# Patient Record
Sex: MALE | Race: WHITE | NOT HISPANIC OR LATINO | Employment: FULL TIME | URBAN - METROPOLITAN AREA
[De-identification: names, ages, dates, MRNs, and addresses within clinical notes are randomized per-mention and may not be internally consistent; named-entity substitution may affect disease eponyms.]

---

## 2017-02-27 ENCOUNTER — ALLSCRIPTS OFFICE VISIT (OUTPATIENT)
Dept: OTHER | Facility: OTHER | Age: 62
End: 2017-02-27

## 2017-02-27 DIAGNOSIS — S43.432A SUPERIOR GLENOID LABRUM LESION OF LEFT SHOULDER: ICD-10-CM

## 2017-03-07 ENCOUNTER — GENERIC CONVERSION - ENCOUNTER (OUTPATIENT)
Dept: OTHER | Facility: OTHER | Age: 62
End: 2017-03-07

## 2017-03-07 ENCOUNTER — APPOINTMENT (OUTPATIENT)
Dept: PHYSICAL THERAPY | Age: 62
End: 2017-03-07
Payer: COMMERCIAL

## 2017-03-07 DIAGNOSIS — S43.432A SUPERIOR GLENOID LABRUM LESION OF LEFT SHOULDER: ICD-10-CM

## 2017-03-07 PROCEDURE — 97110 THERAPEUTIC EXERCISES: CPT

## 2017-03-07 PROCEDURE — 97161 PT EVAL LOW COMPLEX 20 MIN: CPT

## 2017-03-14 ENCOUNTER — APPOINTMENT (OUTPATIENT)
Dept: PHYSICAL THERAPY | Age: 62
End: 2017-03-14
Payer: COMMERCIAL

## 2017-03-15 ENCOUNTER — APPOINTMENT (OUTPATIENT)
Dept: PHYSICAL THERAPY | Age: 62
End: 2017-03-15
Payer: COMMERCIAL

## 2017-03-15 PROCEDURE — 97140 MANUAL THERAPY 1/> REGIONS: CPT

## 2017-03-15 PROCEDURE — 97110 THERAPEUTIC EXERCISES: CPT

## 2017-03-16 ENCOUNTER — APPOINTMENT (OUTPATIENT)
Dept: PHYSICAL THERAPY | Age: 62
End: 2017-03-16
Payer: COMMERCIAL

## 2017-03-16 PROCEDURE — 97140 MANUAL THERAPY 1/> REGIONS: CPT

## 2017-03-16 PROCEDURE — 97110 THERAPEUTIC EXERCISES: CPT

## 2017-03-20 ENCOUNTER — APPOINTMENT (OUTPATIENT)
Dept: PHYSICAL THERAPY | Age: 62
End: 2017-03-20
Payer: COMMERCIAL

## 2017-03-20 PROCEDURE — 97110 THERAPEUTIC EXERCISES: CPT

## 2017-03-20 PROCEDURE — 97140 MANUAL THERAPY 1/> REGIONS: CPT

## 2017-03-24 ENCOUNTER — APPOINTMENT (OUTPATIENT)
Dept: PHYSICAL THERAPY | Age: 62
End: 2017-03-24
Payer: COMMERCIAL

## 2017-03-24 PROCEDURE — 97140 MANUAL THERAPY 1/> REGIONS: CPT

## 2017-03-24 PROCEDURE — 97110 THERAPEUTIC EXERCISES: CPT

## 2017-03-27 ENCOUNTER — APPOINTMENT (OUTPATIENT)
Dept: PHYSICAL THERAPY | Age: 62
End: 2017-03-27
Payer: COMMERCIAL

## 2017-03-27 PROCEDURE — 97110 THERAPEUTIC EXERCISES: CPT

## 2017-03-27 PROCEDURE — 97140 MANUAL THERAPY 1/> REGIONS: CPT

## 2017-03-29 ENCOUNTER — APPOINTMENT (OUTPATIENT)
Dept: PHYSICAL THERAPY | Age: 62
End: 2017-03-29
Payer: COMMERCIAL

## 2017-03-29 PROCEDURE — 97110 THERAPEUTIC EXERCISES: CPT

## 2017-04-03 ENCOUNTER — APPOINTMENT (OUTPATIENT)
Dept: PHYSICAL THERAPY | Age: 62
End: 2017-04-03
Payer: COMMERCIAL

## 2017-04-06 ENCOUNTER — GENERIC CONVERSION - ENCOUNTER (OUTPATIENT)
Dept: OTHER | Facility: OTHER | Age: 62
End: 2017-04-06

## 2017-04-06 ENCOUNTER — APPOINTMENT (OUTPATIENT)
Dept: PHYSICAL THERAPY | Age: 62
End: 2017-04-06
Payer: COMMERCIAL

## 2017-04-06 PROCEDURE — 97530 THERAPEUTIC ACTIVITIES: CPT

## 2017-04-06 PROCEDURE — 97110 THERAPEUTIC EXERCISES: CPT

## 2017-04-17 ENCOUNTER — ALLSCRIPTS OFFICE VISIT (OUTPATIENT)
Dept: OTHER | Facility: OTHER | Age: 62
End: 2017-04-17

## 2017-04-17 DIAGNOSIS — K21.9 GASTRO-ESOPHAGEAL REFLUX DISEASE WITHOUT ESOPHAGITIS: ICD-10-CM

## 2017-04-17 DIAGNOSIS — S43.432A SUPERIOR GLENOID LABRUM LESION OF LEFT SHOULDER: ICD-10-CM

## 2017-04-17 DIAGNOSIS — Z12.5 ENCOUNTER FOR SCREENING FOR MALIGNANT NEOPLASM OF PROSTATE: ICD-10-CM

## 2017-04-24 ENCOUNTER — APPOINTMENT (OUTPATIENT)
Dept: PHYSICAL THERAPY | Age: 62
End: 2017-04-24
Payer: COMMERCIAL

## 2017-04-25 ENCOUNTER — GENERIC CONVERSION - ENCOUNTER (OUTPATIENT)
Dept: OTHER | Facility: OTHER | Age: 62
End: 2017-04-25

## 2017-04-25 ENCOUNTER — APPOINTMENT (OUTPATIENT)
Dept: PHYSICAL THERAPY | Facility: CLINIC | Age: 62
End: 2017-04-25
Payer: COMMERCIAL

## 2017-04-25 DIAGNOSIS — S43.432A SUPERIOR GLENOID LABRUM LESION OF LEFT SHOULDER: ICD-10-CM

## 2017-04-25 PROCEDURE — 97110 THERAPEUTIC EXERCISES: CPT

## 2017-04-25 PROCEDURE — 97161 PT EVAL LOW COMPLEX 20 MIN: CPT

## 2017-04-25 RX ORDER — LANOLIN ALCOHOL/MO/W.PET/CERES
400 CREAM (GRAM) TOPICAL DAILY
COMMUNITY

## 2017-04-25 RX ORDER — ASPIRIN 81 MG/1
81 TABLET ORAL DAILY
COMMUNITY
End: 2019-04-08 | Stop reason: ALTCHOICE

## 2017-04-25 RX ORDER — TADALAFIL 20 MG/1
20 TABLET ORAL DAILY PRN
COMMUNITY
End: 2019-04-08 | Stop reason: ALTCHOICE

## 2017-04-25 RX ORDER — MULTIVITAMIN
1 TABLET ORAL DAILY
COMMUNITY

## 2017-04-27 ENCOUNTER — ALLSCRIPTS OFFICE VISIT (OUTPATIENT)
Dept: OTHER | Facility: OTHER | Age: 62
End: 2017-04-27

## 2017-05-01 ENCOUNTER — ANESTHESIA EVENT (OUTPATIENT)
Dept: PERIOP | Facility: HOSPITAL | Age: 62
End: 2017-05-01
Payer: COMMERCIAL

## 2017-05-02 ENCOUNTER — ANESTHESIA (OUTPATIENT)
Dept: PERIOP | Facility: HOSPITAL | Age: 62
End: 2017-05-02
Payer: COMMERCIAL

## 2017-05-02 ENCOUNTER — HOSPITAL ENCOUNTER (OUTPATIENT)
Facility: HOSPITAL | Age: 62
Setting detail: OUTPATIENT SURGERY
Discharge: HOME | End: 2017-05-02
Attending: ORTHOPAEDIC SURGERY | Admitting: ORTHOPAEDIC SURGERY
Payer: COMMERCIAL

## 2017-05-02 VITALS
HEIGHT: 66 IN | RESPIRATION RATE: 18 BRPM | HEART RATE: 83 BPM | DIASTOLIC BLOOD PRESSURE: 78 MMHG | WEIGHT: 200 LBS | OXYGEN SATURATION: 95 % | SYSTOLIC BLOOD PRESSURE: 122 MMHG | BODY MASS INDEX: 32.14 KG/M2 | TEMPERATURE: 99 F

## 2017-05-02 PROBLEM — S43.432A SUPERIOR GLENOID LABRUM LESION OF LEFT SHOULDER: Status: ACTIVE | Noted: 2017-05-02

## 2017-05-02 PROCEDURE — C1713 ANCHOR/SCREW BN/BN,TIS/BN: HCPCS | Performed by: ORTHOPAEDIC SURGERY

## 2017-05-02 DEVICE — DEPUY MITEK HEALIX ADVANCE 4.5 BR: Type: IMPLANTABLE DEVICE | Status: FUNCTIONAL

## 2017-05-02 RX ORDER — ACETAMINOPHEN 325 MG/1
650 TABLET ORAL EVERY 6 HOURS PRN
Status: DISCONTINUED | OUTPATIENT
Start: 2017-05-02 | End: 2017-05-02 | Stop reason: HOSPADM

## 2017-05-02 RX ORDER — ALBUTEROL SULFATE 2.5 MG/3ML
2.5 SOLUTION RESPIRATORY (INHALATION) AS NEEDED
Status: DISCONTINUED | OUTPATIENT
Start: 2017-05-02 | End: 2017-05-02 | Stop reason: HOSPADM

## 2017-05-02 RX ORDER — KETOROLAC TROMETHAMINE 30 MG/ML
INJECTION, SOLUTION INTRAMUSCULAR; INTRAVENOUS AS NEEDED
Status: DISCONTINUED | OUTPATIENT
Start: 2017-05-02 | End: 2017-05-02 | Stop reason: SURG

## 2017-05-02 RX ORDER — FENTANYL CITRATE/PF 50 MCG/ML
25 SYRINGE (ML) INJECTION
Status: DISCONTINUED | OUTPATIENT
Start: 2017-05-02 | End: 2017-05-02 | Stop reason: HOSPADM

## 2017-05-02 RX ORDER — ONDANSETRON 2 MG/ML
4 INJECTION INTRAMUSCULAR; INTRAVENOUS EVERY 6 HOURS PRN
Status: DISCONTINUED | OUTPATIENT
Start: 2017-05-02 | End: 2017-05-02 | Stop reason: HOSPADM

## 2017-05-02 RX ORDER — MIDAZOLAM HYDROCHLORIDE 1 MG/ML
INJECTION INTRAMUSCULAR; INTRAVENOUS AS NEEDED
Status: DISCONTINUED | OUTPATIENT
Start: 2017-05-02 | End: 2017-05-02 | Stop reason: SURG

## 2017-05-02 RX ORDER — OXYCODONE HYDROCHLORIDE 5 MG/1
5 TABLET ORAL EVERY 4 HOURS PRN
Status: DISCONTINUED | OUTPATIENT
Start: 2017-05-02 | End: 2017-05-02 | Stop reason: HOSPADM

## 2017-05-02 RX ORDER — FENTANYL CITRATE 50 UG/ML
INJECTION, SOLUTION INTRAMUSCULAR; INTRAVENOUS AS NEEDED
Status: DISCONTINUED | OUTPATIENT
Start: 2017-05-02 | End: 2017-05-02 | Stop reason: SURG

## 2017-05-02 RX ORDER — PROPRANOLOL/HYDROCHLOROTHIAZID 40 MG-25MG
1000 TABLET ORAL DAILY
COMMUNITY

## 2017-05-02 RX ORDER — METOCLOPRAMIDE HYDROCHLORIDE 5 MG/ML
10 INJECTION INTRAMUSCULAR; INTRAVENOUS EVERY 6 HOURS PRN
Status: DISCONTINUED | OUTPATIENT
Start: 2017-05-02 | End: 2017-05-02 | Stop reason: HOSPADM

## 2017-05-02 RX ORDER — LIDOCAINE HYDROCHLORIDE 10 MG/ML
INJECTION, SOLUTION INFILTRATION; PERINEURAL AS NEEDED
Status: DISCONTINUED | OUTPATIENT
Start: 2017-05-02 | End: 2017-05-02 | Stop reason: SURG

## 2017-05-02 RX ORDER — PROPOFOL 10 MG/ML
INJECTION, EMULSION INTRAVENOUS AS NEEDED
Status: DISCONTINUED | OUTPATIENT
Start: 2017-05-02 | End: 2017-05-02 | Stop reason: SURG

## 2017-05-02 RX ORDER — ROPIVACAINE HYDROCHLORIDE 5 MG/ML
INJECTION, SOLUTION EPIDURAL; INFILTRATION; PERINEURAL AS NEEDED
Status: DISCONTINUED | OUTPATIENT
Start: 2017-05-02 | End: 2017-05-02 | Stop reason: SURG

## 2017-05-02 RX ORDER — ONDANSETRON 2 MG/ML
INJECTION INTRAMUSCULAR; INTRAVENOUS AS NEEDED
Status: DISCONTINUED | OUTPATIENT
Start: 2017-05-02 | End: 2017-05-02 | Stop reason: SURG

## 2017-05-02 RX ORDER — ONDANSETRON 2 MG/ML
4 INJECTION INTRAMUSCULAR; INTRAVENOUS ONCE AS NEEDED
Status: DISCONTINUED | OUTPATIENT
Start: 2017-05-02 | End: 2017-05-02 | Stop reason: HOSPADM

## 2017-05-02 RX ORDER — SODIUM CHLORIDE, SODIUM LACTATE, POTASSIUM CHLORIDE, CALCIUM CHLORIDE 600; 310; 30; 20 MG/100ML; MG/100ML; MG/100ML; MG/100ML
125 INJECTION, SOLUTION INTRAVENOUS CONTINUOUS
Status: DISCONTINUED | OUTPATIENT
Start: 2017-05-02 | End: 2017-05-02 | Stop reason: HOSPADM

## 2017-05-02 RX ORDER — OXYCODONE HYDROCHLORIDE 5 MG/1
TABLET ORAL
Qty: 45 TABLET | Refills: 0 | Status: SHIPPED | OUTPATIENT
Start: 2017-05-02 | End: 2018-05-30 | Stop reason: ALTCHOICE

## 2017-05-02 RX ADMIN — Medication 2000 MG: at 10:48

## 2017-05-02 RX ADMIN — KETOROLAC TROMETHAMINE 30 MG: 30 INJECTION, SOLUTION INTRAMUSCULAR at 11:31

## 2017-05-02 RX ADMIN — SODIUM CHLORIDE, SODIUM LACTATE, POTASSIUM CHLORIDE, AND CALCIUM CHLORIDE 125 ML/HR: .6; .31; .03; .02 INJECTION, SOLUTION INTRAVENOUS at 08:27

## 2017-05-02 RX ADMIN — SODIUM CHLORIDE, SODIUM LACTATE, POTASSIUM CHLORIDE, AND CALCIUM CHLORIDE: .6; .31; .03; .02 INJECTION, SOLUTION INTRAVENOUS at 10:35

## 2017-05-02 RX ADMIN — ROPIVACAINE HYDROCHLORIDE: 2 INJECTION, SOLUTION EPIDURAL; INFILTRATION at 12:11

## 2017-05-02 RX ADMIN — FENTANYL CITRATE 50 MCG: 50 INJECTION, SOLUTION INTRAMUSCULAR; INTRAVENOUS at 10:59

## 2017-05-02 RX ADMIN — ROPIVACAINE HYDROCHLORIDE 20 ML: 5 INJECTION, SOLUTION EPIDURAL; INFILTRATION; PERINEURAL at 10:01

## 2017-05-02 RX ADMIN — SODIUM CHLORIDE, SODIUM LACTATE, POTASSIUM CHLORIDE, AND CALCIUM CHLORIDE 125 ML/HR: .6; .31; .03; .02 INJECTION, SOLUTION INTRAVENOUS at 12:30

## 2017-05-02 RX ADMIN — MIDAZOLAM HYDROCHLORIDE 2 MG: 1 INJECTION, SOLUTION INTRAMUSCULAR; INTRAVENOUS at 09:55

## 2017-05-02 RX ADMIN — FENTANYL CITRATE 50 MCG: 50 INJECTION, SOLUTION INTRAMUSCULAR; INTRAVENOUS at 09:55

## 2017-05-02 RX ADMIN — DEXAMETHASONE SODIUM PHOSPHATE 4 MG: 10 INJECTION INTRAMUSCULAR; INTRAVENOUS at 10:55

## 2017-05-02 RX ADMIN — LIDOCAINE HYDROCHLORIDE 50 MG: 10 INJECTION, SOLUTION INFILTRATION; PERINEURAL at 10:41

## 2017-05-02 RX ADMIN — ONDANSETRON 4 MG: 2 INJECTION INTRAMUSCULAR; INTRAVENOUS at 10:55

## 2017-05-02 RX ADMIN — PROPOFOL 200 MG: 10 INJECTION, EMULSION INTRAVENOUS at 10:41

## 2017-05-04 ENCOUNTER — ALLSCRIPTS OFFICE VISIT (OUTPATIENT)
Dept: OTHER | Facility: OTHER | Age: 62
End: 2017-05-04

## 2017-05-05 ENCOUNTER — APPOINTMENT (OUTPATIENT)
Dept: PHYSICAL THERAPY | Facility: CLINIC | Age: 62
End: 2017-05-05
Payer: COMMERCIAL

## 2017-05-05 PROCEDURE — 97110 THERAPEUTIC EXERCISES: CPT

## 2017-05-05 PROCEDURE — 97140 MANUAL THERAPY 1/> REGIONS: CPT

## 2017-05-09 ENCOUNTER — APPOINTMENT (OUTPATIENT)
Dept: PHYSICAL THERAPY | Facility: CLINIC | Age: 62
End: 2017-05-09
Payer: COMMERCIAL

## 2017-05-09 PROCEDURE — 97140 MANUAL THERAPY 1/> REGIONS: CPT

## 2017-05-09 PROCEDURE — 97110 THERAPEUTIC EXERCISES: CPT

## 2017-05-10 ENCOUNTER — GENERIC CONVERSION - ENCOUNTER (OUTPATIENT)
Dept: OTHER | Facility: OTHER | Age: 62
End: 2017-05-10

## 2017-05-12 ENCOUNTER — APPOINTMENT (OUTPATIENT)
Dept: PHYSICAL THERAPY | Facility: CLINIC | Age: 62
End: 2017-05-12
Payer: COMMERCIAL

## 2017-05-12 PROCEDURE — 97140 MANUAL THERAPY 1/> REGIONS: CPT

## 2017-05-12 PROCEDURE — 97110 THERAPEUTIC EXERCISES: CPT

## 2017-05-16 ENCOUNTER — APPOINTMENT (OUTPATIENT)
Dept: PHYSICAL THERAPY | Facility: CLINIC | Age: 62
End: 2017-05-16
Payer: COMMERCIAL

## 2017-05-16 PROCEDURE — 97140 MANUAL THERAPY 1/> REGIONS: CPT

## 2017-05-16 PROCEDURE — 97110 THERAPEUTIC EXERCISES: CPT

## 2017-05-18 ENCOUNTER — GENERIC CONVERSION - ENCOUNTER (OUTPATIENT)
Dept: OTHER | Facility: OTHER | Age: 62
End: 2017-05-18

## 2017-05-19 ENCOUNTER — APPOINTMENT (OUTPATIENT)
Dept: PHYSICAL THERAPY | Facility: CLINIC | Age: 62
End: 2017-05-19
Payer: COMMERCIAL

## 2017-05-19 PROCEDURE — 97110 THERAPEUTIC EXERCISES: CPT

## 2017-05-19 PROCEDURE — 97140 MANUAL THERAPY 1/> REGIONS: CPT

## 2017-05-23 ENCOUNTER — APPOINTMENT (OUTPATIENT)
Dept: PHYSICAL THERAPY | Facility: CLINIC | Age: 62
End: 2017-05-23
Payer: COMMERCIAL

## 2017-05-23 PROCEDURE — 97140 MANUAL THERAPY 1/> REGIONS: CPT

## 2017-05-23 PROCEDURE — 97110 THERAPEUTIC EXERCISES: CPT

## 2017-05-26 ENCOUNTER — APPOINTMENT (OUTPATIENT)
Dept: PHYSICAL THERAPY | Facility: CLINIC | Age: 62
End: 2017-05-26
Payer: COMMERCIAL

## 2017-05-26 PROCEDURE — 97140 MANUAL THERAPY 1/> REGIONS: CPT

## 2017-05-26 PROCEDURE — 97110 THERAPEUTIC EXERCISES: CPT

## 2017-05-30 ENCOUNTER — APPOINTMENT (OUTPATIENT)
Dept: PHYSICAL THERAPY | Facility: CLINIC | Age: 62
End: 2017-05-30
Payer: COMMERCIAL

## 2017-05-30 PROCEDURE — 97140 MANUAL THERAPY 1/> REGIONS: CPT

## 2017-05-30 PROCEDURE — 97110 THERAPEUTIC EXERCISES: CPT

## 2017-06-02 ENCOUNTER — APPOINTMENT (OUTPATIENT)
Dept: PHYSICAL THERAPY | Facility: CLINIC | Age: 62
End: 2017-06-02
Payer: COMMERCIAL

## 2017-06-02 PROCEDURE — 97140 MANUAL THERAPY 1/> REGIONS: CPT

## 2017-06-02 PROCEDURE — 97110 THERAPEUTIC EXERCISES: CPT

## 2017-06-07 ENCOUNTER — GENERIC CONVERSION - ENCOUNTER (OUTPATIENT)
Dept: OTHER | Facility: OTHER | Age: 62
End: 2017-06-07

## 2017-06-07 LAB
A/G RATIO (HISTORICAL): 1.9 (ref 1.2–2.2)
ALBUMIN SERPL BCP-MCNC: 4.4 G/DL (ref 3.6–4.8)
ALP SERPL-CCNC: 45 IU/L (ref 39–117)
ALT SERPL W P-5'-P-CCNC: 15 IU/L (ref 0–44)
AMBIG ABBREV CMP14 DEFAULT (HISTORICAL): NORMAL
AMBIG ABBREV LP DEFAULT (HISTORICAL): NORMAL
AST SERPL W P-5'-P-CCNC: 19 IU/L (ref 0–40)
BASOPHILS # BLD AUTO: 0 X10E3/UL (ref 0–0.2)
BASOPHILS # BLD AUTO: 1 %
BILIRUB SERPL-MCNC: 0.9 MG/DL (ref 0–1.2)
BUN SERPL-MCNC: 14 MG/DL (ref 8–27)
BUN/CREA RATIO (HISTORICAL): 15 (ref 10–24)
CALCIUM SERPL-MCNC: 8.9 MG/DL (ref 8.6–10.2)
CHLORIDE SERPL-SCNC: 101 MMOL/L (ref 96–106)
CHOLEST SERPL-MCNC: 204 MG/DL (ref 100–199)
CO2 SERPL-SCNC: 22 MMOL/L (ref 18–29)
CREAT SERPL-MCNC: 0.96 MG/DL (ref 0.76–1.27)
DEPRECATED RDW RBC AUTO: 13.1 % (ref 12.3–15.4)
EGFR AFRICAN AMERICAN (HISTORICAL): 99 ML/MIN/1.73
EGFR-AMERICAN CALC (HISTORICAL): 86 ML/MIN/1.73
EOSINOPHIL # BLD AUTO: 0.2 X10E3/UL (ref 0–0.4)
EOSINOPHIL # BLD AUTO: 3 %
GLUCOSE SERPL-MCNC: 101 MG/DL (ref 65–99)
HCT VFR BLD AUTO: 42.3 % (ref 37.5–51)
HDLC SERPL-MCNC: 80 MG/DL
HGB BLD-MCNC: 14.7 G/DL (ref 12.6–17.7)
IMM.GRANULOCYTES (CD4/8) (HISTORICAL): 0 %
IMM.GRANULOCYTES (CD4/8) (HISTORICAL): 0 X10E3/UL (ref 0–0.1)
LDLC SERPL CALC-MCNC: 113 MG/DL (ref 0–99)
LYMPHOCYTES # BLD AUTO: 1.6 X10E3/UL (ref 0.7–3.1)
LYMPHOCYTES # BLD AUTO: 28 %
MCH RBC QN AUTO: 31.8 PG (ref 26.6–33)
MCHC RBC AUTO-ENTMCNC: 34.8 G/DL (ref 31.5–35.7)
MCV RBC AUTO: 92 FL (ref 79–97)
MONOCYTES # BLD AUTO: 0.5 X10E3/UL (ref 0.1–0.9)
MONOCYTES (HISTORICAL): 9 %
NEUTROPHILS # BLD AUTO: 3.4 X10E3/UL (ref 1.4–7)
NEUTROPHILS # BLD AUTO: 59 %
PLATELET # BLD AUTO: 243 X10E3/UL (ref 150–379)
POTASSIUM SERPL-SCNC: 4.3 MMOL/L (ref 3.5–5.2)
PROSTATE SPECIFIC ANTIGEN (HISTORICAL): 1.3 NG/ML (ref 0–4)
RBC (HISTORICAL): 4.62 X10E6/UL (ref 4.14–5.8)
SODIUM SERPL-SCNC: 141 MMOL/L (ref 134–144)
TOT. GLOBULIN, SERUM (HISTORICAL): 2.3 G/DL (ref 1.5–4.5)
TOTAL PROTEIN (HISTORICAL): 6.7 G/DL (ref 6–8.5)
TRIGL SERPL-MCNC: 57 MG/DL (ref 0–149)
VLDLC SERPL CALC-MCNC: 11 MG/DL (ref 5–40)
WBC # BLD AUTO: 5.7 X10E3/UL (ref 3.4–10.8)

## 2017-06-08 LAB — TSH SERPL DL<=0.05 MIU/L-ACNC: 1.63 UIU/ML (ref 0.45–4.5)

## 2017-06-09 ENCOUNTER — GENERIC CONVERSION - ENCOUNTER (OUTPATIENT)
Dept: OTHER | Facility: OTHER | Age: 62
End: 2017-06-09

## 2017-06-09 ENCOUNTER — APPOINTMENT (OUTPATIENT)
Dept: PHYSICAL THERAPY | Facility: CLINIC | Age: 62
End: 2017-06-09
Payer: COMMERCIAL

## 2017-06-09 PROCEDURE — 97140 MANUAL THERAPY 1/> REGIONS: CPT

## 2017-06-09 PROCEDURE — 97110 THERAPEUTIC EXERCISES: CPT

## 2017-06-15 ENCOUNTER — APPOINTMENT (OUTPATIENT)
Dept: PHYSICAL THERAPY | Facility: CLINIC | Age: 62
End: 2017-06-15
Payer: COMMERCIAL

## 2017-06-15 PROCEDURE — 97110 THERAPEUTIC EXERCISES: CPT

## 2017-06-15 PROCEDURE — 97140 MANUAL THERAPY 1/> REGIONS: CPT

## 2017-06-22 ENCOUNTER — APPOINTMENT (OUTPATIENT)
Dept: PHYSICAL THERAPY | Facility: CLINIC | Age: 62
End: 2017-06-22
Payer: COMMERCIAL

## 2017-06-22 PROCEDURE — 97140 MANUAL THERAPY 1/> REGIONS: CPT

## 2017-06-22 PROCEDURE — 97110 THERAPEUTIC EXERCISES: CPT

## 2017-06-29 ENCOUNTER — GENERIC CONVERSION - ENCOUNTER (OUTPATIENT)
Dept: OTHER | Facility: OTHER | Age: 62
End: 2017-06-29

## 2017-06-29 ENCOUNTER — APPOINTMENT (OUTPATIENT)
Dept: PHYSICAL THERAPY | Facility: CLINIC | Age: 62
End: 2017-06-29
Payer: COMMERCIAL

## 2017-06-29 PROCEDURE — 97110 THERAPEUTIC EXERCISES: CPT

## 2017-06-29 PROCEDURE — 97140 MANUAL THERAPY 1/> REGIONS: CPT

## 2017-07-06 ENCOUNTER — ALLSCRIPTS OFFICE VISIT (OUTPATIENT)
Dept: OTHER | Facility: OTHER | Age: 62
End: 2017-07-06

## 2017-12-14 ENCOUNTER — ALLSCRIPTS OFFICE VISIT (OUTPATIENT)
Dept: OTHER | Facility: OTHER | Age: 62
End: 2017-12-14

## 2017-12-15 NOTE — PROGRESS NOTES
Assessment  1  Hordeolum externum of left upper eyelid (373 11) (H00 014)    Plan  Hordeolum externum of left upper eyelid    · Cephalexin 500 MG Oral Capsule; 1 PO BID    Discussion/Summary  Discussion Summary:   ADD KEFLEXWARM COMPRESSES 2-3 X A DAYCALL IF NO BETTER BY MONDAY  Chief Complaint  Chief Complaint Free Text Note Form: LEFT EYE LID SWOLLEN FOR 4-5 DAYS      History of Present Illness  Eye Pain: Meliton Jones presents with complaints of no eye pain  Associated symptoms include eye redness-- and-- lid swelling, but-- no eye itching,-- no foreign body sensation,-- no eye tenderness,-- no eye discharge,-- no visual distortion,-- no vision loss,-- no photophobia,-- no headache,-- no nausea,-- no vomiting,-- no trichiasis,-- no facial pain,-- no swollen glands,-- no fever,-- no fatigue,-- no myalgia-- and-- no arthralgia  HPI: LEFT EYE IRRITATED REDDENED UPPER LID- NO PAIN NO DISCHARGE; NO FEVER NO FOREIGN BODY SENSATION      Review of Systems  Complete-Male:  Constitutional: No fever or chills, feels well, no tiredness, no recent weight gain or weight loss  Eyes: No complaints of eye pain, no red eyes, no discharge from eyes, no itchy eyes,-- no eye pain,-- no eyesight problems,-- no dryness of the eyes,-- eyes not red,-- no purulent discharge from the eyes-- and-- no itching of the eyes  ENT: LEFT UPPER LID REDDENED, but-- no complaints of earache, no hearing loss, no nosebleeds, no nasal discharge, no sore throat, no hoarseness  ROS Reviewed:   ROS reviewed  Active Problems  1  Acute right hip pain (719 45) (M25 551)   2  Aftercare following surgery (V58 89) (Z48 89)   3  Asthma in adult without complication (846 27) (I94 211)   4  Ecchymosis (459 89) (R58)   5  Encounter for prostate cancer screening (V76 44) (Z12 5)   6  Erectile dysfunction of non-organic origin (302 72) (F52 21)   7  Esophageal reflux (530 81) (K21 9)   8  Eye swollen, left (379 92) (H57 8)   9   Incomplete tear of left rotator cuff (840 4) (M75 112)   10  Need for influenza vaccination (V04 81) (Z23)   11  Pain of left shoulder region (719 41) (M25 512)   12  Positive UCHE (antinuclear antibody) (795 79) (R76 8)   13  Pre-operative clearance (V72 84) (Z01 818)   14  Sore throat (462) (J02 9)   15  Superior glenoid labrum lesion of left shoulder, subsequent encounter (V58 89,840 7) (L48 326X)    Past Medical History  1  History of Crush Injury Of The Fingers Of The Right Hand (927 3)   2  History of Erectile dysfunction of non-organic origin (302 72) (F52 21)   3  History of fatigue (V13 89) (Z87 898)   4  History of Shoulder joint pain, unspecified laterality   5  History of Umbilical hernia (448 6) (K42 9)  Active Problems And Past Medical History Reviewed: The active problems and past medical history were reviewed and updated today  Surgical History  1  History of Hernia Repair  Surgical History Reviewed: The surgical history was reviewed and updated today  Family History  Family History    1  No pertinent family history  Family History Reviewed: The family history was reviewed and updated today  Social History   · Alcohol use (V49 89) (Z78 9)   · Drinks coffee   · Former smoker (S81 87) (V55 368)   · Never a smoker  Social History Reviewed: The social history was reviewed and updated today  The social history was reviewed and is unchanged  Current Meds   1  Asmanex 120 Metered Doses 220 MCG/INH Inhalation Aerosol Powder Breath Activated; INHALE 1 PUFF DAILY IN THE EVENING; Therapy: [id number] to Recorded   2  Aspirin 81 MG Oral Tablet Chewable; CHEW AND SWALLOW 1 TABLET DAILY Recorded  Medication List Reviewed: The medication list was reviewed and updated today  Allergies  1  No Known Drug Allergies  2   Animal dander - Cats    Vitals  Vital Signs    Recorded: 07GLV9184 03:25PM   Temperature 98 6 F   Heart Rate 72   Respiration 18   Systolic 400   Diastolic 70   Height 5 ft 6 in   Weight 212 lb    BMI Calculated 34 22   BSA Calculated 2 05     Physical Exam   Constitutional  General appearance: No acute distress, well appearing and well nourished  Eyes  Conjunctiva and lids: Abnormal  -- LEFT UPPER LID MID PORITON REDDENED AND IRRITATED; EOMIP ERRLA;  Pupils and irises: Equal, round and reactive to light  Ears, Nose, Mouth, and Throat  External inspection of ears and nose: Normal    Otoscopic examination: Tympanic membrance translucent with normal light reflex  Canals patent without erythema  Nasal mucosa, septum, and turbinates: Normal without edema or erythema  Oropharynx: Normal with no erythema, edema, exudate or lesions  Pulmonary  Respiratory effort: No increased work of breathing or signs of respiratory distress  Auscultation of lungs: Clear to auscultation, equal breath sounds bilaterally, no wheezes, no rales, no rhonci  Signatures   Electronically signed by :  Jb Judd [facility]; Dec 14 2017  3:40PM EST                       (Author)

## 2018-01-02 ENCOUNTER — ALLSCRIPTS OFFICE VISIT (OUTPATIENT)
Dept: OTHER | Facility: OTHER | Age: 63
End: 2018-01-02

## 2018-01-02 DIAGNOSIS — M79.673 PAIN OF FOOT: ICD-10-CM

## 2018-01-02 DIAGNOSIS — S99.922A INJURY OF LEFT FOOT: ICD-10-CM

## 2018-01-02 DIAGNOSIS — S90.122A: ICD-10-CM

## 2018-01-03 ENCOUNTER — TRANSCRIBE ORDERS (OUTPATIENT)
Dept: ADMINISTRATIVE | Age: 63
End: 2018-01-03

## 2018-01-03 ENCOUNTER — APPOINTMENT (OUTPATIENT)
Dept: RADIOLOGY | Age: 63
End: 2018-01-03
Payer: COMMERCIAL

## 2018-01-03 DIAGNOSIS — S90.122A: ICD-10-CM

## 2018-01-03 PROCEDURE — 73660 X-RAY EXAM OF TOE(S): CPT

## 2018-01-03 NOTE — PROGRESS NOTES
Assessment   1  Contusion of second toe, left (924 3) (S90 122A)    Plan   Contusion of second toe, left    · * XR TOE LEFT SECOND MIN 2 VIEWS; Status:Active; Requested WRN:[id number]; Discussion/Summary      X-rays of left 2nd toe  Symptom treatment ice rest elevate NSAIDs  Follow up once results are back  call if any changes  History of Present Illness   HPI: Patient was walking on treadmill on 12/29 when he heard a crack with pain and swelling of left 2nd toe  He has continued to have pain with weight-bearing  mild swelling  no bruising  On no pain medications  Review of Systems        Constitutional: recent 5 lb weight loss, but-- no fever-- and-- no chills  Musculoskeletal: as noted in HPI  Integumentary: no rashes  Active Problems   1  Acute right hip pain (719 45) (M25 551)   2  Aftercare following surgery (V58 89) (Z48 89)   3  Asthma in adult without complication (318 16) (I31 338)   4  Ecchymosis (459 89) (R58)   5  Encounter for prostate cancer screening (V76 44) (Z12 5)   6  Erectile dysfunction of non-organic origin (302 72) (F52 21)   7  Esophageal reflux (530 81) (K21 9)   8  Eye swollen, left (379 92) (H57 8)   9  Hordeolum externum of left upper eyelid (373 11) (H00 014)   10  Incomplete tear of left rotator cuff (840 4) (M75 112)   11  Need for influenza vaccination (V04 81) (Z23)   12  Pain of left shoulder region (719 41) (M25 512)   13  Positive UCHE (antinuclear antibody) (795 79) (R76 8)   14  Pre-operative clearance (V72 84) (Z01 818)   15  Sore throat (462) (J02 9)   16  Superior glenoid labrum lesion of left shoulder, subsequent encounter (V58 89,840 7)      (Y30 916F)    Past Medical History   1  History of Crush Injury Of The Fingers Of The Right Hand (927 3)   2  History of Erectile dysfunction of non-organic origin (302 72) (F52 21)   3  History of fatigue (V13 89) (Z87 898)   4  History of Shoulder joint pain, unspecified laterality   5   History of Umbilical hernia (553 1) (K42 9)    Family History   Family History    1  No pertinent family history    Social History    · Alcohol use (V49 89) (Z78 9)   · Drinks coffee   · Former smoker (L05 95) (D49 603)   · Never a smoker    Surgical History   1  History of Hernia Repair    Current Meds    1  Asmanex 120 Metered Doses 220 MCG/INH Inhalation Aerosol Powder Breath Activated;     INHALE 1 PUFF DAILY IN THE EVENING; Therapy: [id number] to Recorded   2  Aspirin 81 MG Oral Tablet Chewable; CHEW AND SWALLOW 1 TABLET DAILY     Recorded    Allergies   1  No Known Drug Allergies  2  Animal dander - Cats    Vitals    Recorded: 02Jan2018 04:51PM   Temperature 97 8 F   Heart Rate 70   Respiration 18   Systolic 863   Diastolic 90   Height 5 ft 6 in   Weight 207 lb 6 oz   BMI Calculated 33 47   BSA Calculated 2 03     Physical Exam        Constitutional      General appearance: No acute distress, well appearing and well nourished  Cardiovascular      Pedal pulses: 2+ bilaterally  Examination of extremities for edema and/or varicosities: Normal        Musculoskeletal      Inspection/palpation of joints, bones, and muscles: Abnormal  -- Mild swelling and tenderness left 2nd toe  No deformity  Range of motion: Normal  -- range of motion of toes left foot and left ankle normal       Skin      Skin and subcutaneous tissue: Abnormal  -- 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

## 2018-01-05 ENCOUNTER — GENERIC CONVERSION - ENCOUNTER (OUTPATIENT)
Dept: OTHER | Facility: OTHER | Age: 63
End: 2018-01-05

## 2018-01-11 NOTE — RESULT NOTES
Verified Results  (LC) Antinuclear Antibodies, IFA [id number] 10:35AM Black Kee     Test Name Result Flag Reference   Antinuclear Antibodies, IFA Positive A    Negative   <1:80                                                      Borderline  1:80                                                      Positive   >1:80   Speckled Pattern 1:160 H    Dense Fine Speckled pattern is noted  This pattern suggests the  presence of DFS70 antibody which has a low prevalence in systemic  autoimmune rheumatic diseases  Note: Comment     A positive UCHE result may occur in healthy individuals (low  titer) or be associated with a variety of diseases  See  interpretation chart which is not all inclusive:  Pattern      Antigen Detected  Suggested Disease Association  -----------  ----------------  -----------------------------  Homogeneous  DNA(ds,ss),       SLE - High titers               Nucleosomes,               Histones          Drug-induced SLE  -----------  ----------------  -----------------------------  Speckled     Sm, RNP, SCL-70,  SLE,MCTD,PSS (diffuse form),               SS-A/SS-B         Sjogrens  -----------  ----------------  -----------------------------  Nucleolar    SCL-70, PM-1/SCL  High titers Scleroderma,                                 PM/DM  -----------  ----------------  -----------------------------  Centromere   Centromere        PSS (limited form) w/Crest                                 syndrome variable  -----------  ----------------  -----------------------------  Nuclear Dot  Sp100,s41-ureuqs  Primary Biliary Cirrhosis  -----------  ----------------  -----------------------------  Nuclear      ,            Primary Biliary Cirrhosis  Membrane     james A,B,C  -----------  ----------------  -----------------------------     (LC) Lyme, Western Blot, Serum [id number] 10:35AM Black Kee     Test Name Result Flag Reference   IgG P93 Ab  Absent     IgG P66 Ab   Absent     IgG P58 Ab  Absent     IgG P45 Ab  Absent     IgG P41 Ab  Absent     IgG P39 Ab  Absent     IgG P30 Ab  Absent     IgG P28 Ab  Absent     IgG P23 Ab  Absent     IgG P18 Ab  Absent     Lyme IgG WB Interp  Negative     Positive: 5 of the following                                                Borrelia-specific bands:                                                18,23,28,30,39,41,45,58,                                                66, and 93  Negative: No bands or banding                                                patterns which do not                                                meet positive criteria  IgM P41 Ab  Absent     IgM P39 Ab  Absent     IgM P23 Ab  Present A    Lyme IgM WB Interp  Negative     Note: An equivocal or positive EIA result followed by a negative  Western Blot result is considered NEGATIVE  An equivocal or positive  EIA result followed by a positive Western Blot is considered POSITIVE  by the CDC  Positive: 2 of the following bands: 23,39 or 41  Negative: No bands or banding patterns which do not meet positive  criteria  Criteria for positivity are those recommended by CDC/ASTPHLD   p23=Osp C, j81=xyjflozxe  Note:  Sera from individuals with the following may cross react in the  Lyme Western Blot assays: other spirochetal diseases (periodontal  disease, leptospirosis, relapsing fever, yaws, and pinta);  connective autoimmune (Rheumatoid Arthritis and Systemic Lupus  Erythematosus and also individuals with Antinuclear Antibody);  other infections COFFEE [facility] Spotted Fever; Ellie-Barr Virus,  and Cytomegalovirus)

## 2018-01-11 NOTE — RESULT NOTES
Discussion/Summary   Labs are very good!      Verified Results  (1) CBC/PLT/DIFF [id number] 07:53AM Waleska Eric     Test Name Result Flag Reference   WBC 5 7 x10E3/uL  3 4-10 8   RBC 4 62 x10E6/uL  4 14-5 80   Hemoglobin 14 7 g/dL  12 6-17 7   Hematocrit 42 3 %  37 5-51 0   MCV 92 fL  79-97   MCH 31 8 pg  26 6-33 0   MCHC 34 8 g/dL  31 5-35 7   RDW 13 1 %  12 3-15 4   Platelets 598 R97J3/DR  150-379   Neutrophils 59 %     Lymphs 28 %     Monocytes 9 %     Eos 3 %     Basos 1 %     Neutrophils (Absolute) 3 4 x10E3/uL  1 4-7 0   Lymphs (Absolute) 1 6 x10E3/uL  0 7-3 1   Monocytes(Absolute) 0 5 x10E3/uL  0 1-0 9   Eos (Absolute) 0 2 x10E3/uL  0 0-0 4   Baso (Absolute) 0 0 x10E3/uL  0 0-0 2   Immature Granulocytes 0 %     Immature Grans (Abs) 0 0 x10E3/uL  0 0-0 1     (1) COMPREHENSIVE METABOLIC PANEL [id number] 60:98XC Waleska Eric     Test Name Result Flag Reference   Glucose, Serum 101 mg/dL H 65-99   BUN 14 mg/dL  8-27   Creatinine, Serum 0 96 mg/dL  0 76-1 27   BUN/Creatinine Ratio 15  10-24   Sodium, Serum 141 mmol/L  134-144   Potassium, Serum 4 3 mmol/L  3 5-5 2   Chloride, Serum 101 mmol/L     Carbon Dioxide, Total 22 mmol/L  18-29   Calcium, Serum 8 9 mg/dL  8 6-10 2   Protein, Total, Serum 6 7 g/dL  6 0-8 5   Albumin, Serum 4 4 g/dL  3 6-4 8   Globulin, Total 2 3 g/dL  1 5-4 5   A/G Ratio 1 9  1 2-2 2   Bilirubin, Total 0 9 mg/dL  0 0-1 2   Alkaline Phosphatase, S 45 IU/L     AST (SGOT) 19 IU/L  0-40   ALT (SGPT) 15 IU/L  0-44   eGFR If NonAfricn Am 86 mL/min/1 73  >59   eGFR If Africn Am 99 mL/min/1 73  >59     (LC) Lipid Panel [id number] 07:53AM Waleska Eric     Test Name Result Flag Reference   Cholesterol, Total 204 mg/dL H 100-199   Triglycerides 57 mg/dL  0-149   HDL Cholesterol 80 mg/dL  >39   VLDL Cholesterol Herrera 11 mg/dL  5-40   LDL Cholesterol Calc 113 mg/dL H 0-99     (1) TSH [id number] 07:53AM Waleska Eric     Test Name Result Flag Reference   TSH 1 630 uIU/mL  0 450-4 500     (1) PSA (SCREEN) (Dx V76 44 Screen for Prostate Cancer) [id number] 07:53AM Black Tyri     Test Name Result Flag Reference   Prostate Specific Ag, Serum 1 3 ng/mL  0 0-4 0   Roche ECLIA methodology  According to the American Urological Association, Serum PSA should  decrease and remain at undetectable levels after radical  prostatectomy  The AUA defines biochemical recurrence as an initial  PSA value 0 2 ng/mL or greater followed by a subsequent confirmatory  PSA value 0 2 ng/mL or greater  Values obtained with different assay methods or kits cannot be used  interchangeably  Results cannot be interpreted as absolute evidence  of the presence or absence of malignant disease  [facility] Waldrop [id number] Default [id number] 07:53AM Black Tyri     Test Name Result Flag Reference   Eduar Sicard CMP14 Default Comment     A hand-written panel/profile was received from your office  In  accordance with the [facility] Ambiguous Test Code Policy dated July 7962, we have completed your order by using the closest currently  or formerly recognized AMA panel  We have assigned Comprehensive  Metabolic Panel (14), Test Code #427765 to this request   If this  is not the testing you wished to receive on this specimen, please  contact the [facility] Client Inquiry/Technical Services Department  to clarify the test order  We appreciate your business  [facility]) Keenan Sicard LP Default [id number] 07:53AM Black Tyri     Test Name Result Flag Reference   Ambig Abbrev LP Default Comment     A hand-written panel/profile was received from your office  In  accordance with the [facility] Ambiguous Test Code Policy dated July 6626, we have completed your order by using the closest currently  or formerly recognized AMA panel    We have assigned Lipid Panel,  Test Code #012932 to this request  If this is not the testing you  wished to receive on this specimen, please contact the [facility]  Client Inquiry/Technical Services Department to clarify the test  order  We appreciate your business

## 2018-01-12 VITALS
WEIGHT: 205.25 LBS | BODY MASS INDEX: 33.13 KG/M2 | SYSTOLIC BLOOD PRESSURE: 132 MMHG | HEART RATE: 65 BPM | DIASTOLIC BLOOD PRESSURE: 88 MMHG

## 2018-01-12 NOTE — RESULT NOTES
Verified Results  [facility]) Sedimentation Rate-Westergren [id number] 02:39PM Darrion Banter     Test Name Result Flag Reference   Sedimentation Rate-Westergren 11 mm/hr  0-30     (LC) Lyme, Western Blot, Serum [id number] 02:39PM [facility]     Test Name Result Flag Reference   Lyme IgM WB Interp  Negative     Note: An equivocal or positive EIA result followed by a negative  Western Blot result is considered NEGATIVE  An equivocal or positive  EIA result followed by a positive Western Blot is considered POSITIVE  by the CDC  Positive: 2 of the following bands: 23,39 or 41  Negative: No bands or banding patterns which do not meet positive  criteria  Criteria for positivity are those recommended by CDC/ASTPHLD   p23=Osp C, h09=ihpdzzwnd  Note:  Sera from individuals with the following may cross react in the  Lyme Western Blot assays: other spirochetal diseases (periodontal  disease, leptospirosis, relapsing fever, yaws, and pinta);  connective autoimmune (Rheumatoid Arthritis and Systemic Lupus  Erythematosus and also individuals with Antinuclear Antibody);  other infections COFFEE [facility] Spotted Fever; Ellie-Barr Virus,  and Cytomegalovirus)  IgM P23 Ab  Present A    IgM P39 Ab  Absent     IgM P41 Ab  Absent     Lyme IgG WB Interp  Negative     Positive: 5 of the following                                                Borrelia-specific bands:                                                18,23,28,30,39,41,45,58,                                                66, and 93  Negative: No bands or banding                                                patterns which do not                                                meet positive criteria  IgG P18 Ab  Absent     IgG P23 Ab  Absent     IgG P28 Ab  Absent     IgG P30 Ab  Absent     IgG P39 Ab  Absent     IgG P41 Ab  Absent     IgG P45 Ab  Absent     IgG P58 Ab  Absent     IgG P66 Ab  Absent     IgG P93 Ab  Absent       (LC) Antinuclear Antibodies, IFA [id number] 02:39PM Jaclyn Ribeiro     Test Name Result Flag Reference   Note: Comment     A positive UCHE result may occur in healthy individuals (low  titer) or be associated with a variety of diseases  See  interpretation chart which is not all inclusive:  Pattern      Antigen Detected  Suggested Disease Association  -----------  ----------------  -----------------------------  Homogeneous  DNA(ds,ss),       SLE - High titers               Nucleosomes,               Histones          Drug-induced SLE  -----------  ----------------  -----------------------------  Speckled     Sm, RNP, SCL-70,  SLE,MCTD,PSS (diffuse form),               SS-A/SS-B         Sjogrens  -----------  ----------------  -----------------------------  Nucleolar    SCL-70, PM-1/SCL  High titers Scleroderma,                                 PM/DM  -----------  ----------------  -----------------------------  Centromere   Centromere        PSS (limited form) w/Crest                                 syndrome variable  -----------  ----------------  -----------------------------  Nuclear Dot  Sp100,b11-mvqkgj  Primary Biliary Cirrhosis  -----------  ----------------  -----------------------------  Nuclear      ,            Primary Biliary Cirrhosis  Membrane     james A,B,C  -----------  ----------------  -----------------------------   Homogeneous Pattern 1:80     Antinuclear Antibodies, IFA Positive A    Negative   <1:80                                                      Borderline  1:80                                                      Positive   >1:80       Plan  Positive UCHE (antinuclear antibody)    · (1) UCHE SCREEN W/REFLEX TO TITER/PATTERN; Status:Active; Requested  for:[id number];    · (1) LYME ANTIBODY PROFILE W/REFLEX TO WESTERN BLOT; Status:Active;   Requested for:[id number];

## 2018-01-13 VITALS
TEMPERATURE: 96.8 F | WEIGHT: 205.4 LBS | SYSTOLIC BLOOD PRESSURE: 122 MMHG | DIASTOLIC BLOOD PRESSURE: 84 MMHG | RESPIRATION RATE: 18 BRPM | HEIGHT: 66 IN | HEART RATE: 80 BPM | BODY MASS INDEX: 33.01 KG/M2

## 2018-01-13 VITALS
HEIGHT: 66 IN | BODY MASS INDEX: 32.47 KG/M2 | SYSTOLIC BLOOD PRESSURE: 114 MMHG | HEART RATE: 72 BPM | WEIGHT: 202 LBS | DIASTOLIC BLOOD PRESSURE: 78 MMHG

## 2018-01-14 VITALS
HEART RATE: 69 BPM | WEIGHT: 208 LBS | BODY MASS INDEX: 32.65 KG/M2 | HEIGHT: 67 IN | DIASTOLIC BLOOD PRESSURE: 88 MMHG | SYSTOLIC BLOOD PRESSURE: 144 MMHG

## 2018-01-14 VITALS
WEIGHT: 205.13 LBS | DIASTOLIC BLOOD PRESSURE: 84 MMHG | SYSTOLIC BLOOD PRESSURE: 145 MMHG | BODY MASS INDEX: 32.61 KG/M2 | HEART RATE: 75 BPM

## 2018-01-14 NOTE — RESULT NOTES
Verified Results  * XR SHOULDER 2+ VIEW LEFT 28Oct2016 12:37PM [employer] Order Number: [id number]     Test Name Result Flag Reference   XR SHOULDER 2+ VW LEFT (Report)     LEFT SHOULDER     INDICATION: Chronic left shoulder pain  COMPARISON: 5/18/2006, CT 5/25/2006     VIEWS: 3; 3 images     FINDINGS:     There is no acute fracture or dislocation  Mild osteoarthritis glenohumeral joint  No lytic or blastic lesions are seen  Soft tissues are unremarkable  IMPRESSION:     Mild osteoarthritis glenohumeral joint  Workstation performed: [id number]     Signed by:   Wendy López DO   10/31/16     * XR SHOULDER 2+ VIEW RIGHT 28Oct2016 12:37PM [employer] Order Number: [id number]     Test Name Result Flag Reference   XR SHOULDER 2+ VW RIGHT (Report)     RIGHT SHOULDER     INDICATION: Chronic right shoulder pain  COMPARISON: None     VIEWS: 3; 3 images     FINDINGS:     There is no acute fracture or dislocation  Mild osteoarthritis glenohumeral and acromioclavicular joints  No lytic or blastic lesions are seen  Soft tissues are unremarkable  IMPRESSION:     Mild osteoarthritis glenohumeral and acromioclavicular joints         Workstation performed: [id number]     Signed by:   Wendy López DO   10/31/16

## 2018-01-14 NOTE — RESULT NOTES
Verified Results  * XR HIP/PELV 2-3 VWS RIGHT W PELVIS IF PERFORMED [id number] 11:18AM Kristan Coyle Order Number: [id number]     Test Name Result Flag Reference   * XR HIP/PELV 2-3 VWS RIGHT (Report)     RIGHT HIP     INDICATION: right hip pain for several days     COMPARISON: None     VIEWS: AP pelvis and 2 coned down views of the hip; 3 images     FINDINGS:     There is no acute fracture or dislocation  Potential minimal right hip degenerative changes  No lytic or blastic lesions are seen  Soft tissues are unremarkable  IMPRESSION:     Potential minimal right hip degenerative changes, symmetric with the left, without acute or advanced findings         Workstation performed: [id number]     Signed by:   Keila Rodriguez MD   8/31/16

## 2018-01-16 ENCOUNTER — TRANSCRIBE ORDERS (OUTPATIENT)
Dept: ADMINISTRATIVE | Facility: HOSPITAL | Age: 63
End: 2018-01-16

## 2018-01-16 ENCOUNTER — GENERIC CONVERSION - ENCOUNTER (OUTPATIENT)
Dept: OTHER | Facility: OTHER | Age: 63
End: 2018-01-16

## 2018-01-16 ENCOUNTER — APPOINTMENT (OUTPATIENT)
Dept: RADIOLOGY | Facility: MEDICAL CENTER | Age: 63
End: 2018-01-16
Payer: COMMERCIAL

## 2018-01-16 DIAGNOSIS — M79.673 PAIN OF FOOT: ICD-10-CM

## 2018-01-16 DIAGNOSIS — S99.922A INJURY OF LEFT FOOT, INITIAL ENCOUNTER: Primary | ICD-10-CM

## 2018-01-16 PROCEDURE — 73630 X-RAY EXAM OF FOOT: CPT

## 2018-01-16 PROCEDURE — 73660 X-RAY EXAM OF TOE(S): CPT

## 2018-01-19 ENCOUNTER — HOSPITAL ENCOUNTER (OUTPATIENT)
Dept: RADIOLOGY | Age: 63
Discharge: HOME | End: 2018-01-19
Payer: COMMERCIAL

## 2018-01-19 DIAGNOSIS — S99.922A INJURY OF LEFT FOOT: ICD-10-CM

## 2018-01-19 PROCEDURE — 73718 MRI LOWER EXTREMITY W/O DYE: CPT

## 2018-01-23 VITALS
HEIGHT: 66 IN | WEIGHT: 212 LBS | SYSTOLIC BLOOD PRESSURE: 130 MMHG | TEMPERATURE: 98.6 F | HEART RATE: 72 BPM | BODY MASS INDEX: 34.07 KG/M2 | RESPIRATION RATE: 18 BRPM | DIASTOLIC BLOOD PRESSURE: 70 MMHG

## 2018-01-23 VITALS
DIASTOLIC BLOOD PRESSURE: 90 MMHG | SYSTOLIC BLOOD PRESSURE: 140 MMHG | BODY MASS INDEX: 33.33 KG/M2 | HEART RATE: 70 BPM | WEIGHT: 207.38 LBS | HEIGHT: 66 IN | RESPIRATION RATE: 18 BRPM | TEMPERATURE: 97.8 F

## 2018-01-23 NOTE — RESULT NOTES
Verified Results  * XR TOE LEFT SECOND MIN 2 VIEWS [id number] 01:23PM [facility] Order Number: [id number]     Test Name Result Flag Reference   XR TOE LEFT SECOND MIN 2 VIEWS (Report)     LEFT TOES     INDICATION: 2ND TOE PLANTAR PAIN AFTER WALKING ON TREADMILL 5 DAYS AGO  COMPARISON: None     VIEWS: AP view foot and 2 cone-down views of the 2nd digit     IMAGES: 3     FINDINGS:     There is no acute fracture or dislocation  Soft tissues are unremarkable  IMPRESSION:     No fracture         Workstation performed: [id number]     Signed by:   Rayshawn Guajardo MD   1/5/18

## 2018-01-24 ENCOUNTER — OFFICE VISIT (OUTPATIENT)
Dept: OBGYN CLINIC | Facility: MEDICAL CENTER | Age: 63
End: 2018-01-24
Payer: COMMERCIAL

## 2018-01-24 VITALS
WEIGHT: 205 LBS | HEIGHT: 68 IN | DIASTOLIC BLOOD PRESSURE: 85 MMHG | HEART RATE: 86 BPM | BODY MASS INDEX: 31.07 KG/M2 | SYSTOLIC BLOOD PRESSURE: 116 MMHG

## 2018-01-24 VITALS
BODY MASS INDEX: 33.27 KG/M2 | WEIGHT: 207 LBS | DIASTOLIC BLOOD PRESSURE: 88 MMHG | HEIGHT: 66 IN | SYSTOLIC BLOOD PRESSURE: 144 MMHG | HEART RATE: 84 BPM

## 2018-01-24 DIAGNOSIS — G57.62 MORTON'S NEUROMA OF LEFT FOOT: ICD-10-CM

## 2018-01-24 DIAGNOSIS — M89.9 BONE LESION: Primary | ICD-10-CM

## 2018-01-24 PROCEDURE — 99214 OFFICE O/P EST MOD 30 MIN: CPT | Performed by: FAMILY MEDICINE

## 2018-01-24 NOTE — PROGRESS NOTES
Assessment/Plan:    No problem-specific Assessment & Plan notes found for this encounter  Diagnoses and all orders for this visit:    Bone lesion  -     MRI foot/forefoot toes left w wo contrast; Future    Long's neuroma of left foot  -     Ambulatory referral to Podiatry; Future    Other orders  -     mometasone (ASMANEX 120 METERED DOSES) 220 MCG/INH inhaler; Inhale        Left foot pain  Date of injury 12/29/2017 walking on treadmill her crack    MRI found Long's neuroma as well as concomitant incidental  left 3rd metatarsal bone lesion    1  Follow-up after MRI with IV contrast evaluate bone lesion  2  Referral to podiatry for Lnog's neuroma injection  3   Instructed patient to utilize Cam walker boot only as needed sparingly  Chief complaint:  Follow-up left foot pain    Subjective:      Patient ID: Ryan Espino is a 65 y o  male  HPI   Patient here for follow-up left foot injury with persistent pain  Date of injury was 12/29/2017  Follow-up interval approximately 4 weeks  Last visit patient MRI ordered for pain localized to 2nd metatarsal head    He had x-rays performed which were normal  MRI demonstrates Long's neuroma 2nd interspace as well as a lesion the 3rd metatarsal diaphysis is not fully characterize it without IV contrast per radiologist     The following portions of the patient's history were reviewed and updated as appropriate: allergies, current medications, past family history, past medical history, past social history, past surgical history and problem list     Review of Systems    Denies fevers chills numbness tingling weakness  Objective:     Physical Exam    LEFT ANKLE & FOOT EXAM  Observation  GAIT:  normal    Inspection  Erythema: no  Ecchymosis: no  Edema:  none      Tenderness  Proximal Fibula: no  (Maisonneuve frx)  AiTFL: no  (2cm proximal-medial to tip lateral malleolus 92% sens, 29% spec)  ATFL: no  CFL: no  PTFL: no  Achilles:  no  deltoid: No     Bony Tenderpoints:  Lateral Malleolus: no  Base of 5th MT: no  Medial Malleolus: no  Navicular: no  Talar dome: No  Positive tenderness plantar surface of foot 2nd interspace with clicking 20 second 3rd metatarsal     ROM  Dorsiflexion: intact  Plantarflexion: intact    Muscle Strength  Pronation: intact without pain  Supination: intact without pain    Tib-Fib Squeeze: negative  (rgyusgcsorsg-yu-xudphixqcxsjxr squeeze; 26% sens, 88% spec; rule in test)    Calcaneal Squeeze: negative    Dorsiflexion (+) ER Stress Test: negative  (reproduce ATiFL mech; 71% sens, 63% spec)         Past Medical History:   Diagnosis Date    Asthma     GERD (gastroesophageal reflux disease)        Family History   Problem Relation Age of Onset    No Known Problems Mother     No Known Problems Father        Social History     Social History    Marital status:      Spouse name: N/A    Number of children: N/A    Years of education: N/A     Social History Main Topics    Smoking status: Never Smoker    Smokeless tobacco: Never Used    Alcohol use Yes      Comment: 'couple glasses' wine/daily     Drug use: No    Sexual activity: Not Asked     Other Topics Concern    None     Social History Narrative    None     Allergies   Allergen Reactions    Other      Animal dander

## 2018-01-26 DIAGNOSIS — M89.9 BONE LESION: Primary | ICD-10-CM

## 2018-05-30 ENCOUNTER — OFFICE VISIT (OUTPATIENT)
Dept: FAMILY MEDICINE CLINIC | Facility: CLINIC | Age: 63
End: 2018-05-30
Payer: COMMERCIAL

## 2018-05-30 ENCOUNTER — TELEPHONE (OUTPATIENT)
Dept: FAMILY MEDICINE CLINIC | Facility: CLINIC | Age: 63
End: 2018-05-30

## 2018-05-30 VITALS
WEIGHT: 212 LBS | DIASTOLIC BLOOD PRESSURE: 74 MMHG | HEART RATE: 76 BPM | RESPIRATION RATE: 16 BRPM | SYSTOLIC BLOOD PRESSURE: 106 MMHG | TEMPERATURE: 96.9 F | BODY MASS INDEX: 32.23 KG/M2

## 2018-05-30 DIAGNOSIS — H10.31 ACUTE BACTERIAL CONJUNCTIVITIS OF RIGHT EYE: Primary | ICD-10-CM

## 2018-05-30 DIAGNOSIS — H10.9 CONJUNCTIVITIS OF RIGHT EYE, UNSPECIFIED CONJUNCTIVITIS TYPE: Primary | ICD-10-CM

## 2018-05-30 PROCEDURE — 99213 OFFICE O/P EST LOW 20 MIN: CPT | Performed by: FAMILY MEDICINE

## 2018-05-30 RX ORDER — FEXOFENADINE HCL 180 MG/1
180 TABLET ORAL DAILY
Qty: 30 TABLET | Refills: 0 | Status: SHIPPED | OUTPATIENT
Start: 2018-05-30 | End: 2018-10-10 | Stop reason: ALTCHOICE

## 2018-05-30 RX ORDER — IBUPROFEN 200 MG
TABLET ORAL 4 TIMES DAILY
Qty: 28.3 G | Refills: 0 | Status: SHIPPED | OUTPATIENT
Start: 2018-05-30 | End: 2018-10-10 | Stop reason: ALTCHOICE

## 2018-05-30 NOTE — PROGRESS NOTES
FAMILY MEDICINE PROGRESS NOTE  Waqas Lopez 65 y o  male   DATE: May 30, 2018     ASSESSMENT and PLAN:  Waqas Lopez is a 65 y o  male with:     1  Acute bacterial conjunctivitis of right eye  Conjunctivitis, likely bacterial given significant erythema of right conjunctiva, but likely has a component of allergic conjunctivitis as well that he is not treating  Abx gtt x 1 week and add antihistamine orally, pt does not want to do intranasal steroids  If symptoms don't improve, would try antihistamine drops  - fexofenadine (ALLEGRA) 180 MG tablet; Take 1 tablet (180 mg total) by mouth daily  Dispense: 30 tablet; Refill: 0  - neomycin-bacitracin-polymyxin (NEOSPORIN) 5-400-5,000 ointment; Apply topically 4 (four) times a day for 7 days  Dispense: 28 3 g; Refill: 0      SUBJECTIVE:  Waqas Lopez is a 65 y o  male who presents today with a chief complaint of Eye Pain (Right)  First noticed yesterday morning and worse this morning  He woke up with a right red eye, first thought he had something on the eyelid, but doesn't have the sensation anymore  Says his eye feels wrong and watering like crazy  Has known allergies- sleeps with the window open- Asmanex  Eye Pain    The right eye is affected  This is a new problem  The current episode started yesterday  The problem occurs constantly  The problem has been gradually worsening  There was no injury mechanism  The patient is experiencing no pain  There is no known exposure to pink eye  He does not wear contacts  Associated symptoms include an eye discharge (clear discharge), eye redness and a foreign body sensation  Pertinent negatives include no blurred vision (not when he waers his glasses), double vision, fever, itching, nausea, photophobia or recent URI  He has tried eye drops (Visine) for the symptoms  The treatment provided mild relief  Review of Systems   Constitutional: Negative for chills and fever     Eyes: Positive for pain, discharge (clear discharge) and redness  Negative for blurred vision (not when he waers his glasses), double vision, photophobia and itching  Gastrointestinal: Negative for nausea  I have reviewed the patient's PMH, Social History, Medication List and Allergies as appropriate  OBJECTIVE:  /74 (BP Location: Left arm, Patient Position: Sitting, Cuff Size: Large)   Pulse 76   Temp (!) 96 9 °F (36 1 °C) (Tympanic)   Resp 16   Wt 96 2 kg (212 lb)   BMI 32 23 kg/m²    Physical Exam   Constitutional: He appears well-developed and well-nourished  No distress  Eyes: EOM are normal  Pupils are equal, round, and reactive to light  Right eye exhibits discharge (clear)  Right eye exhibits no chemosis, no exudate and no hordeolum  No foreign body present in the right eye  Right conjunctiva is injected  Right conjunctiva has a hemorrhage  Left conjunctiva is not injected  Left conjunctiva has no hemorrhage  Skin: He is not diaphoretic  Nohelia Hernandez MD

## 2018-05-30 NOTE — PATIENT INSTRUCTIONS
Conjunctivitis   AMBULATORY CARE:   Conjunctivitis,  or pink eye, is inflammation of your conjunctiva  The conjunctiva is a thin tissue that covers the front of your eye and the back of your eyelids  The conjunctiva helps protect your eye and keep it moist  Conjunctivitis may be caused by bacteria, allergies, or a virus  If your conjunctivitis is caused by bacteria, it may get better on its own in about 7 days  Viral conjunctivitis can last up to 3 weeks  Common symptoms may include any of the following: You will usually have symptoms in both eyes if your conjunctivitis is caused by allergies  You may also have other allergic symptoms, such as a rash or runny nose  Symptoms will usually start in 1 eye if your conjunctivitis is caused by a virus or bacteria  · Redness in the whites of your eye    · Itching in your eye or around your eye    · Feeling like there is something in your eye    · Watery or thick, sticky discharge    · Crusty eyelids when you wake up in the morning    · Burning, stinging, or swelling in your eye    · Pain when you see bright light  Seek care immediately if:   · You have worsening eye pain  · The swelling in your eye gets worse, even after treatment  · Your vision suddenly becomes worse or you cannot see at all  Contact your healthcare provider if:   · You develop a fever and ear pain  · You have tiny bumps or spots of blood on your eye  · You have questions or concerns about your condition or care  Treatment  will depend on the cause of your conjunctivitis  You may need antibiotics or allergy medicine as a pill, eye drop, or eye ointment  Manage your symptoms:   · Apply a cool compress  Wet a washcloth with cold water and place it on your eye  This will help decrease itching and irritation  · Do not wear contact lenses  They can irritate your eye  Throw away the pair you are using and ask when you can wear them again   Use a new pair of lenses when your healthcare provider says it is okay  · Avoid irritants  Stay away from smoke filled areas  Shield your eyes from wind and sun  · Flush your eye  You may need to flush your eye with saline to help decrease your symptoms  Ask for more information on how to flush your eye  Medicines:  Treatment depends on what is causing your conjunctivitis  You may be given any of the following:  · Allergy medicine  helps decrease itchy, red, swollen eyes caused by allergies  It may be given as a pill, eye drops, or nasal spray  · Antibiotics  may be needed if your conjunctivitis is caused by bacteria  This medicine may be given as a pill, eye drops, or eye ointment  · Take your medicine as directed  Contact your healthcare provider if you think your medicine is not helping or if you have side effects  Tell him or her if you are allergic to any medicine  Keep a list of the medicines, vitamins, and herbs you take  Include the amounts, and when and why you take them  Bring the list or the pill bottles to follow-up visits  Carry your medicine list with you in case of an emergency  Prevent the spread of conjunctivitis:   · Wash your hands with soap and water often  Wash your hands before and after you touch your eyes  Also wash your hands before you prepare or eat food and after you use the bathroom or change a diaper  · Avoid allergens  Try to avoid the things that cause your allergies, such as pets, dust, or grass  · Avoid contact with others  Do not share towels or washcloths  Try to stay away from others as much as possible  Ask when you can return to work or school  · Throw away eye makeup  The bacteria that caused your conjunctivitis can stay in eye makeup  Throw away mascara and other eye makeup  © 2017 [employer]  Information is for End User's use only and may not be sold, redistributed or otherwise used for commercial purposes   All illustrations and images included in [facility] are the copyrighted property of A D A M [employer] Inc  or Shankar Iglesias  The above information is an  only  It is not intended as medical advice for individual conditions or treatments  Talk to your doctor, nurse or pharmacist before following any medical regimen to see if it is safe and effective for you

## 2018-05-30 NOTE — TELEPHONE ENCOUNTER
Patient called   He picked up his medications, he was under the impression you were giving him eye drops and he got an ointment, he wants to make sure this is what you wanted him to have

## 2018-05-30 NOTE — TELEPHONE ENCOUNTER
Called patient and advised   Patient concerned because it says topical ointment for minor cuts, scrapes, and burns   It says do not use in the eyes

## 2018-05-30 NOTE — TELEPHONE ENCOUNTER
Then he is absolutely right, they probably gave him the wrong one or I sent in the wrong one     I am sending drops in, thanks

## 2018-05-30 NOTE — TELEPHONE ENCOUNTER
It can be drops or ointment, either will work fine because its the medication in it that matters, it just depends on what the insurance prefers

## 2018-05-31 ENCOUNTER — TELEPHONE (OUTPATIENT)
Dept: FAMILY MEDICINE CLINIC | Facility: CLINIC | Age: 63
End: 2018-05-31

## 2018-05-31 DIAGNOSIS — H10.9 CONJUNCTIVITIS OF RIGHT EYE, UNSPECIFIED CONJUNCTIVITIS TYPE: Primary | ICD-10-CM

## 2018-05-31 NOTE — TELEPHONE ENCOUNTER
Pharmacy called to let you know Azasite 1% is not available for patient   She is asking if there is another alternative you would like pt to have  [phone]

## 2018-06-08 ENCOUNTER — TELEPHONE (OUTPATIENT)
Dept: UROLOGY | Facility: AMBULATORY SURGERY CENTER | Age: 63
End: 2018-06-08

## 2018-06-08 ENCOUNTER — HOSPITAL ENCOUNTER (EMERGENCY)
Facility: HOSPITAL | Age: 63
Discharge: HOME | End: 2018-06-08
Attending: EMERGENCY MEDICINE
Payer: COMMERCIAL

## 2018-06-08 ENCOUNTER — APPOINTMENT (EMERGENCY)
Dept: CT IMAGING | Facility: HOSPITAL | Age: 63
End: 2018-06-08
Payer: COMMERCIAL

## 2018-06-08 VITALS
DIASTOLIC BLOOD PRESSURE: 91 MMHG | RESPIRATION RATE: 18 BRPM | SYSTOLIC BLOOD PRESSURE: 144 MMHG | TEMPERATURE: 98 F | HEART RATE: 77 BPM | WEIGHT: 214.73 LBS | OXYGEN SATURATION: 96 % | BODY MASS INDEX: 32.65 KG/M2

## 2018-06-08 DIAGNOSIS — N30.91 HEMORRHAGIC CYSTITIS: Primary | ICD-10-CM

## 2018-06-08 DIAGNOSIS — N32.89 BLADDER WALL THICKENING: ICD-10-CM

## 2018-06-08 LAB
ANION GAP SERPL CALCULATED.3IONS-SCNC: 7 MMOL/L (ref 4–13)
BACTERIA UR QL AUTO: ABNORMAL /HPF
BASOPHILS # BLD AUTO: 0.05 THOUSANDS/ΜL (ref 0–0.1)
BASOPHILS NFR BLD AUTO: 1 % (ref 0–1)
BILIRUB UR QL STRIP: NEGATIVE
BUN SERPL-MCNC: 12 MG/DL (ref 5–25)
CALCIUM SERPL-MCNC: 9.2 MG/DL (ref 8.3–10.1)
CHLORIDE SERPL-SCNC: 104 MMOL/L (ref 100–108)
CLARITY UR: ABNORMAL
CO2 SERPL-SCNC: 28 MMOL/L (ref 21–32)
COLOR UR: YELLOW
CREAT SERPL-MCNC: 0.89 MG/DL (ref 0.6–1.3)
EOSINOPHIL # BLD AUTO: 0.2 THOUSAND/ΜL (ref 0–0.61)
EOSINOPHIL NFR BLD AUTO: 2 % (ref 0–6)
ERYTHROCYTE [DISTWIDTH] IN BLOOD BY AUTOMATED COUNT: 13 % (ref 11.6–15.1)
GFR SERPL CREATININE-BSD FRML MDRD: 92 ML/MIN/1.73SQ M
GLUCOSE SERPL-MCNC: 101 MG/DL (ref 65–140)
GLUCOSE UR STRIP-MCNC: NEGATIVE MG/DL
HCT VFR BLD AUTO: 42.9 % (ref 36.5–49.3)
HGB BLD-MCNC: 14.9 G/DL (ref 12–17)
HGB UR QL STRIP.AUTO: ABNORMAL
KETONES UR STRIP-MCNC: NEGATIVE MG/DL
LEUKOCYTE ESTERASE UR QL STRIP: ABNORMAL
LYMPHOCYTES # BLD AUTO: 1.65 THOUSANDS/ΜL (ref 0.6–4.47)
LYMPHOCYTES NFR BLD AUTO: 15 % (ref 14–44)
MCH RBC QN AUTO: 31.8 PG (ref 26.8–34.3)
MCHC RBC AUTO-ENTMCNC: 34.7 G/DL (ref 31.4–37.4)
MCV RBC AUTO: 92 FL (ref 82–98)
MONOCYTES # BLD AUTO: 0.93 THOUSAND/ΜL (ref 0.17–1.22)
MONOCYTES NFR BLD AUTO: 8 % (ref 4–12)
NEUTROPHILS # BLD AUTO: 8.27 THOUSANDS/ΜL (ref 1.85–7.62)
NEUTS SEG NFR BLD AUTO: 74 % (ref 43–75)
NITRITE UR QL STRIP: NEGATIVE
NON-SQ EPI CELLS URNS QL MICRO: ABNORMAL /HPF
OTHER STN SPEC: ABNORMAL
PH UR STRIP.AUTO: 6 [PH] (ref 4.5–8)
PLATELET # BLD AUTO: 285 THOUSANDS/UL (ref 149–390)
PMV BLD AUTO: 8.7 FL (ref 8.9–12.7)
POTASSIUM SERPL-SCNC: 4.1 MMOL/L (ref 3.5–5.3)
PROT UR STRIP-MCNC: >=300 MG/DL
RBC # BLD AUTO: 4.69 MILLION/UL (ref 3.88–5.62)
RBC #/AREA URNS AUTO: ABNORMAL /HPF
SODIUM SERPL-SCNC: 139 MMOL/L (ref 136–145)
SP GR UR STRIP.AUTO: 1.02 (ref 1–1.03)
UROBILINOGEN UR QL STRIP.AUTO: 0.2 E.U./DL
WBC # BLD AUTO: 11.1 THOUSAND/UL (ref 4.31–10.16)
WBC #/AREA URNS AUTO: ABNORMAL /HPF

## 2018-06-08 PROCEDURE — 87186 SC STD MICRODIL/AGAR DIL: CPT

## 2018-06-08 PROCEDURE — 96360 HYDRATION IV INFUSION INIT: CPT

## 2018-06-08 PROCEDURE — 87086 URINE CULTURE/COLONY COUNT: CPT

## 2018-06-08 PROCEDURE — 80048 BASIC METABOLIC PNL TOTAL CA: CPT | Performed by: EMERGENCY MEDICINE

## 2018-06-08 PROCEDURE — 85025 COMPLETE CBC W/AUTO DIFF WBC: CPT | Performed by: EMERGENCY MEDICINE

## 2018-06-08 PROCEDURE — 81001 URINALYSIS AUTO W/SCOPE: CPT

## 2018-06-08 PROCEDURE — 36415 COLL VENOUS BLD VENIPUNCTURE: CPT | Performed by: EMERGENCY MEDICINE

## 2018-06-08 PROCEDURE — 74176 CT ABD & PELVIS W/O CONTRAST: CPT

## 2018-06-08 PROCEDURE — 87147 CULTURE TYPE IMMUNOLOGIC: CPT

## 2018-06-08 PROCEDURE — 99284 EMERGENCY DEPT VISIT MOD MDM: CPT

## 2018-06-08 RX ORDER — PHENAZOPYRIDINE HYDROCHLORIDE 100 MG/1
100 TABLET, FILM COATED ORAL ONCE
Status: COMPLETED | OUTPATIENT
Start: 2018-06-08 | End: 2018-06-08

## 2018-06-08 RX ORDER — CIPROFLOXACIN 500 MG/1
500 TABLET, FILM COATED ORAL 2 TIMES DAILY
Qty: 14 TABLET | Refills: 0 | Status: SHIPPED | OUTPATIENT
Start: 2018-06-08 | End: 2018-06-15

## 2018-06-08 RX ORDER — CIPROFLOXACIN 500 MG/1
500 TABLET, FILM COATED ORAL ONCE
Status: COMPLETED | OUTPATIENT
Start: 2018-06-08 | End: 2018-06-08

## 2018-06-08 RX ORDER — PHENAZOPYRIDINE HYDROCHLORIDE 200 MG/1
200 TABLET, FILM COATED ORAL 3 TIMES DAILY
Qty: 6 TABLET | Refills: 0 | Status: SHIPPED | OUTPATIENT
Start: 2018-06-08 | End: 2018-10-10 | Stop reason: ALTCHOICE

## 2018-06-08 RX ADMIN — SODIUM CHLORIDE 1000 ML: 0.9 INJECTION, SOLUTION INTRAVENOUS at 07:47

## 2018-06-08 RX ADMIN — CIPROFLOXACIN 500 MG: 500 TABLET, FILM COATED ORAL at 08:31

## 2018-06-08 RX ADMIN — PHENAZOPYRIDINE HYDROCHLORIDE 100 MG: 100 TABLET ORAL at 08:31

## 2018-06-08 NOTE — TELEPHONE ENCOUNTER
Triage Needed - Post ER Visit    Intake:    Complaint:  Hematuria    Insurance:  [employer]    Previous urologist:  None    History of Cancer:  No    Location Preferred:   [facility]

## 2018-06-08 NOTE — TELEPHONE ENCOUNTER
Epic ER note reviewed, ER today, discomfort in urethra with urine passage, with bloody mucous  Reports history of weak urinary stream,  UA/ CX done, culture pending,  CT done no obstructing renal calculi, diffuse abnormal appearance of urinary bladder  Patient discharged on Cipro, Pyridium

## 2018-06-08 NOTE — ED PROVIDER NOTES
History  Chief Complaint   Patient presents with    Blood in Urine     Pt  reports painful to urine this am, started with difficulty urinating last night  Pt  reports blood with mucous in urine this am      71-year-old male presents to the emergency department relating that he was having trouble urinating yesterday  He relates that he thought this was [place] to prostate issues    He relates that today his urination was painful    He additionally saw bloody mucousy things in the urine  He admits he has had difficulty starting his urinary stream for some time  Over the past week this has been more noticeable along with a low flow  He does not typically feel as though he empties the bladder  Last night he started noticing discomfort in the urethra with urine passage  Today he appreciated the blood  He did not notice any stand or gravel like material present in the passage  He does notice some mild suprapubic pain  No back pain  No fever, nausea, vomiting or change in appetite  He relates that his stools tend to be on the loose side  He has not had any change in these  There has been no blood presence in them  No swelling or discomfort of the testicles appreciated  No history of similar problems  Past medical history significant for asthma and GERD  Abdominal surgical history significant for umbilical hernia repair approximately 20 years ago  No known personal or family history of ureterolithiasis  Prior to Admission Medications   Prescriptions Last Dose Informant Patient Reported? Taking?    Multiple Vitamin (MULTIVITAMIN) tablet  Self Yes No   Sig: Take 1 tablet by mouth daily   Probiotic Product (PROBIOTIC DAILY PO)  Self Yes No   Sig: Take by mouth   Turmeric 500 MG CAPS  Self Yes No   Sig: Take 1,000 mg by mouth daily   VITAMIN D, CHOLECALCIFEROL, PO  Self Yes No   Sig: Take by mouth   aspirin (ECOTRIN LOW STRENGTH) 81 mg EC tablet  Self Yes No   Sig: Take 81 mg by mouth daily fexofenadine (ALLEGRA) 180 MG tablet   No No   Sig: Take 1 tablet (180 mg total) by mouth daily   folic acid (FOLVITE) 694 mcg tablet  Self Yes No   Sig: Take 400 mcg by mouth daily   gentamicin-prednisoLONE (PRED-G) 0 3-1 % ophthalmic drops   No No   Sig: Administer 1 drop to the right eye 2 (two) times a day for 10 days   mometasone (ASMANEX 120 METERED DOSES) 220 MCG/INH inhaler  Self Yes No   Sig: Inhale   neomycin-bacitracin-polymyxin (NEOSPORIN) 5-400-5,000 ointment   No No   Sig: Apply topically 4 (four) times a day for 7 days   tadalafil (CIALIS) 20 MG tablet  Self Yes No   Sig: Take 20 mg by mouth daily as needed for erectile dysfunction      Facility-Administered Medications: None       Past Medical History:   Diagnosis Date    Asthma     GERD (gastroesophageal reflux disease)        Past Surgical History:   Procedure Laterality Date    HERNIA REPAIR      KNEE SURGERY      OR SHLDR ARTHROSCOP,SURG,REPAIR,SLAP LESION Left 5/2/2017    Procedure: ARTHROSCOPY SHOULDER W/ bicep tendodesis, SAD ;  Surgeon: Winter Green MD;  Location: [facility];  Service: Orthopedics       Family History   Problem Relation Age of Onset    No Known Problems Mother     No Known Problems Father      I have reviewed and agree with the history as documented  Social History   Substance Use Topics    Smoking status: Never Smoker    Smokeless tobacco: Never Used    Alcohol use Yes      Comment: 'couple glasses' wine/daily         Review of Systems   All other systems reviewed and are negative  Physical Exam  Physical Exam   Constitutional: He appears well-developed and well-nourished  Eyes: Conjunctivae and EOM are normal    Cardiovascular: Normal rate and regular rhythm  Pulmonary/Chest: Effort normal and breath sounds normal    Abdominal: Soft  Bowel sounds are normal  He exhibits no distension  There is tenderness (Suprapubic)  There is no guarding  No CVA tenderness   Skin: Skin is warm and dry  Psychiatric: He has a normal mood and affect  His behavior is normal    Nursing note and vitals reviewed  Vital Signs  ED Triage Vitals [06/08/18 0707]   Temperature Pulse Respirations Blood Pressure SpO2   98 °F (36 7 °C) 84 18 (!) 177/108 95 %      Temp Source Heart Rate Source Patient Position - Orthostatic VS BP Location FiO2 (%)   Oral Monitor Lying Right arm --      Pain Score       --           Vitals:    06/08/18 0707   BP: (!) 177/108   Pulse: 84   Patient Position - Orthostatic VS: Lying       Visual Acuity      ED Medications  Medications   sodium chloride 0 9 % bolus 1,000 mL (1,000 mL Intravenous New Bag 6/8/18 0747)   ciprofloxacin (CIPRO) tablet 500 mg (500 mg Oral Given 6/8/18 0831)   phenazopyridine (PYRIDIUM) tablet 100 mg (100 mg Oral Given 6/8/18 0831)       Diagnostic Studies  Results Reviewed     Procedure Component Value Units Date/Time    Urine Microscopic [[id number]]  (Abnormal) Collected:  06/08/18 0736    Lab Status:  Final result Specimen:  Urine from Urine, Clean Catch Updated:  06/08/18 0806     RBC, UA Innumerable (A) /hpf      WBC, UA Innumerable (A) /hpf      Epithelial Cells None Seen /hpf      Bacteria, UA None Seen /hpf      OTHER OBSERVATIONS WBCs Clumped  Glitter Cells    Urine culture [[id number]] Collected:  06/08/18 0736    Lab Status:   In process Specimen:  Urine from Urine, Clean Catch Updated:  06/08/18 2392    Basic metabolic panel [[id number]] Collected:  06/08/18 0730    Lab Status:  Final result Specimen:  Blood from Arm, Left Updated:  06/08/18 0758     Sodium 139 mmol/L      Potassium 4 1 mmol/L      Chloride 104 mmol/L      CO2 28 mmol/L      Anion Gap 7 mmol/L      BUN 12 mg/dL      Creatinine 0 89 mg/dL      Glucose 101 mg/dL      Calcium 9 2 mg/dL      eGFR 92 ml/min/1 73sq m     Narrative:         National Kidney Disease Education Program recommendations are as follows:  GFR calculation is accurate only with a steady state creatinine  Chronic Kidney disease less than 60 ml/min/1 73 sq  meters  Kidney failure less than 15 ml/min/1 73 sq  meters  CBC and differential [[id number]]  (Abnormal) Collected:  06/08/18 0730    Lab Status:  Final result Specimen:  Blood from Arm, Left Updated:  06/08/18 0743     WBC 11 10 (H) Thousand/uL      RBC 4 69 Million/uL      Hemoglobin 14 9 g/dL      Hematocrit 42 9 %      MCV 92 fL      MCH 31 8 pg      MCHC 34 7 g/dL      RDW 13 0 %      MPV 8 7 (L) fL      Platelets 040 Thousands/uL      Neutrophils Relative 74 %      Lymphocytes Relative 15 %      Monocytes Relative 8 %      Eosinophils Relative 2 %      Basophils Relative 1 %      Neutrophils Absolute 8 27 (H) Thousands/µL      Lymphocytes Absolute 1 65 Thousands/µL      Monocytes Absolute 0 93 Thousand/µL      Eosinophils Absolute 0 20 Thousand/µL      Basophils Absolute 0 05 Thousands/µL     ED Urine Macroscopic [[id number]]  (Abnormal) Collected:  06/08/18 0736    Lab Status:  Final result Specimen:  Urine Updated:  06/08/18 0731     Color, UA Yellow     Clarity, UA Cloudy     pH, UA 6 0     Leukocytes, UA Large (A)     Nitrite, UA Negative     Protein, UA >=300 (A) mg/dl      Glucose, UA Negative mg/dl      Ketones, UA Negative mg/dl      Urobilinogen, UA 0 2 E U /dl      Bilirubin, UA Negative     Blood, UA Large (A)     Specific [place], UA 1 020    Narrative:       CLINITEK RESULT                 CT renal stone study abdomen pelvis without contrast   Final Result by Adelaide Hahn DO (06/08 9040)   1  No obstructing renal calculi  2   Diffuse abnormal appearance of the urinary bladder  Although the findings may be related to sequela of prostatic enlargement and bladder outlet obstruction, cystitis or bladder neoplasm not excluded  Recommend follow-up urologic consultation  3   Colonic diverticulosis  4   Hepatomegaly with fatty infiltration  The study was marked in [facility] for immediate notification        Workstation performed: [id number] Procedures  Procedures       Phone Contacts  ED Phone Contact    ED Course  ED Course as of Jun 08 0833 Fri Jun 08, 2018   5203 Study results reviewed with patient  Discussed use of antibiotic and following up with Urology for further evaluation of urinary symptoms and bladder wall thickening  Did mention the possibility of bladder neoplasm in emphasizing need for follow-up even a feeling well following completion of antibiotics  Patient demonstrates understanding of plan  MDM  CritCare Time    Disposition  Final diagnoses:   Hemorrhagic cystitis   Bladder wall thickening     Time reflects when diagnosis was documented in both MDM as applicable and the Disposition within this note     Time User Action Codes Description Comment    6/8/2018  8:27 AM [place] Griffes A Add [N30 90] Cystitis     6/8/2018  8:27 AM Kale Griffes A Remove [N30 90] Cystitis     6/8/2018  8:27 AM Kale Griffes A Add [N30 91] Hemorrhagic cystitis     6/8/2018  8:27 AM Kale Griffes A Add [N32 89] Bladder wall thickening       ED Disposition     ED Disposition Condition Comment    Discharge  Gonzalo A Stofanak discharge to home/self care      Condition at discharge: Good        Follow-up Information     Follow up With Specialties Details Why Nannette Weaver [facility]  52  For Urology [facility] Schedule an appointment as soon as possible for a visit For further evaluation of your bladder wall thickening KARTHIK [address] [id number] [phone]          Patient's Medications   Discharge Prescriptions    CIPROFLOXACIN (CIPRO) 500 MG TABLET    Take 1 tablet (500 mg total) by mouth 2 (two) times a day for 7 days       Start Date: 6/8/2018  End Date: 6/15/2018       Order Dose: 500 mg       Quantity: 14 tablet    Refills: 0    PHENAZOPYRIDINE (PYRIDIUM) 200 MG TABLET    Take 1 tablet (200 mg total) by mouth 3 (three) times a day Start Date: 6/8/2018  End Date: --       Order Dose: 200 mg       Quantity: 6 tablet    Refills: 0     No discharge procedures on file      ED Provider  Electronically Signed by           Efrain Duffy MD  06/08/18 0729

## 2018-06-08 NOTE — DISCHARGE INSTRUCTIONS
Take ciprofloxacin antibiotic as directed until gone to treat urinary tract infection  You may take peridium as directed to assist with discomfort on urination  Call the urology office to schedule an appointment in the next couple of weeks for further evaluation  As discussed, the bladder wall was noted to be thick on your CT scan  This may be the result of your infection alone or indicative of another problem for which further testing will be needed  Return for significant worsening such as development of fever, inability to pass urine or other concerns  Urinary Tract Infection in Men   WHAT YOU NEED TO KNOW:   A urinary tract infection (UTI) is caused by bacteria that get inside your urinary tract  Most bacteria that enter your urinary tract come out when you urinate  If the bacteria stay in your urinary tract, you may get an infection  Your urinary tract includes your kidneys, ureters, bladder, and urethra  Urine is made in your kidneys, and it flows from the ureters to the bladder  Urine leaves the bladder through the urethra  A UTI is more common in your lower urinary tract, which includes your bladder and urethra  DISCHARGE INSTRUCTIONS:   Return to the emergency department if:   · You are urinating very little or not at all  · You have a high fever with shaking chills  · You have side or back pain that gets worse  Contact your healthcare provider if:   · You have a mild fever  · You do not feel better after 2 days of taking antibiotics  · You are vomiting  · You have new symptoms, such as blood or pus in your urine  · You have questions or concerns about your condition or care  Medicines:   · Antibiotics  help fight a bacterial infection  · Medicines  may be given to decrease pain and burning when you urinate  They will also help decrease the feeling that you need to urinate often  These medicines will make your urine orange or red      · Take your medicine as directed  Contact your healthcare provider if you think your medicine is not helping or if you have side effects  Tell him of her if you are allergic to any medicine  Keep a list of the medicines, vitamins, and herbs you take  Include the amounts, and when and why you take them  Bring the list or the pill bottles to follow-up visits  Carry your medicine list with you in case of an emergency  Prevent another UTI:   · Empty your bladder often  Urinate and empty your bladder as soon as you feel the need  Do not hold your urine for long periods of time  · Drink liquids as directed  Ask how much liquid to drink each day and which liquids are best for you  You may need to drink more liquids than usual to help flush out the bacteria  Do not drink alcohol, caffeine, or citrus juices  These can irritate your bladder and increase your symptoms  Your healthcare provider may recommend cranberry juice to help prevent a UTI  · Urinate after you have sex  This can help flush out bacteria passed during sex  · Do pelvic muscle exercises often  Pelvic muscle exercises may help you start and stop urinating  Strong pelvic muscles may help you empty your bladder easier  Squeeze these muscles tightly for 5 seconds like you are trying to hold back urine  Then relax for 5 seconds  Gradually work up to squeezing for 10 seconds  Do 3 sets of 15 repetitions a day, or as directed  Follow up with your healthcare provider as directed:  Write down your questions so you remember to ask them during your visits  © 2017 [employer] [employer] Information is for End User's use only and may not be sold, redistributed or otherwise used for commercial purposes  All illustrations and images included in CareNotes® are the copyrighted property of [employer]  or Shankar Iglesias  The above information is an  only  It is not intended as medical advice for individual conditions or treatments   Talk to your doctor, nurse or pharmacist before following any medical regimen to see if it is safe and effective for you

## 2018-06-08 NOTE — TELEPHONE ENCOUNTER
Schedule with MD within 10 to 14 days,  NP, Hematuria, lower urinary tract symptoms/ bladder wall thickening

## 2018-06-08 NOTE — TELEPHONE ENCOUNTER
Please help with an appointment  The first available NP appt with doctor is 07/11/18  Patient needs to be seen sooner  Patient wants formerly [facility] location, any day and time

## 2018-06-10 LAB — BACTERIA UR CULT: ABNORMAL

## 2018-06-11 NOTE — PROGRESS NOTES
Call patient in regard to test results  States that he has been feeling better since taking cipro  Has an apointment with [facility] urology tomorrow  Will follow-up with them regarding any changes in antibiotics

## 2018-06-12 ENCOUNTER — OFFICE VISIT (OUTPATIENT)
Dept: UROLOGY | Facility: CLINIC | Age: 63
End: 2018-06-12
Payer: COMMERCIAL

## 2018-06-12 VITALS
SYSTOLIC BLOOD PRESSURE: 140 MMHG | HEART RATE: 62 BPM | HEIGHT: 68 IN | DIASTOLIC BLOOD PRESSURE: 90 MMHG | BODY MASS INDEX: 32.8 KG/M2 | WEIGHT: 216.4 LBS

## 2018-06-12 DIAGNOSIS — R31.9 URINARY TRACT INFECTION WITH HEMATURIA, SITE UNSPECIFIED: Primary | ICD-10-CM

## 2018-06-12 DIAGNOSIS — N39.0 URINARY TRACT INFECTION WITH HEMATURIA, SITE UNSPECIFIED: Primary | ICD-10-CM

## 2018-06-12 LAB — POST-VOID RESIDUAL VOLUME, ML POC: 240 ML

## 2018-06-12 PROCEDURE — 51798 US URINE CAPACITY MEASURE: CPT | Performed by: UROLOGY

## 2018-06-12 PROCEDURE — 99204 OFFICE O/P NEW MOD 45 MIN: CPT | Performed by: UROLOGY

## 2018-06-12 NOTE — PROGRESS NOTES
6/12/2018    Sridhar Ha  1955  [id number]    Discussion and Plan    Symptom pattern consistent with urinary tract infection  PVR was measured at 240 cc however patient voided approximately 45 minutes ago  Dietary and hydration recommendations provided to minimize future infection risk  We briefly discussed the prospect of alpha-blocker medications  He will return in 2 months for repeat ultrasound postvoid residual with PSA prior  If residual confirmed or recurring infections developed, Flomax will be started  All questions answered    At this time  1  Urinary tract infection with hematuria, site unspecified  - PSA Total, Diagnostic; Future  - POCT Measure PVR; Future    Assessment      Patient Active Problem List   Diagnosis    Superior glenoid labrum lesion of left shoulder    Urinary tract infection with hematuria       History of Present Illness    Yannick Cintron is a 65 y o  male seen today in regards to a history of urinary tract infection  Symptoms began rather acutely last week with increasing dysuria, frequency, urgency, and scant bloody discharge  He was evaluated in the emergency room where upon a urinary tract infection was diagnosed culture growing Staph  CT scan was otherwise normal except for diffuse bladder wall thickening  Patient denies significant preceding urinary complaints  No history of nephrolithiasis, gross hematuria, or prior urinary tract infections  Denies any prior genitourinary surgery history  Symptoms are gradually resolving currently      Urinary Symptom Assessment        Past Medical History  Past Medical History:   Diagnosis Date    Asthma     GERD (gastroesophageal reflux disease)        Past Social History  Past Surgical History:   Procedure Laterality Date    HERNIA REPAIR      KNEE SURGERY      CO SHLDR ARTHROSCOP,SURG,REPAIR,SLAP LESION Left 5/2/2017    Procedure: ARTHROSCOPY SHOULDER W/ bicep tendodesis, SAD ;  Surgeon: Neno Hernández MD;  Location: [facility];  Service: Orthopedics       Past Family History  Family History   Problem Relation Age of Onset    No Known Problems Mother     No Known Problems Father        Past Social history  Social History     Social History    Marital status:      Spouse name: N/A    Number of children: N/A    Years of education: N/A     Occupational History    Not on file       Social History Main Topics    Smoking status: Never Smoker    Smokeless tobacco: Never Used    Alcohol use Yes      Comment: 'couple glasses' wine/daily     Drug use: No    Sexual activity: Not on file     Other Topics Concern    Not on file     Social History Narrative    No narrative on file       Current Medications  Current Outpatient Prescriptions   Medication Sig Dispense Refill    aspirin (ECOTRIN LOW STRENGTH) 81 mg EC tablet Take 81 mg by mouth daily      ciprofloxacin (CIPRO) 500 mg tablet Take 1 tablet (500 mg total) by mouth 2 (two) times a day for 7 days 14 tablet 0    fexofenadine (ALLEGRA) 180 MG tablet Take 1 tablet (180 mg total) by mouth daily 30 tablet 0    folic acid (FOLVITE) 943 mcg tablet Take 400 mcg by mouth daily      mometasone (ASMANEX 120 METERED DOSES) 220 MCG/INH inhaler Inhale      Multiple Vitamin (MULTIVITAMIN) tablet Take 1 tablet by mouth daily      Probiotic Product (PROBIOTIC DAILY PO) Take by mouth      tadalafil (CIALIS) 20 MG tablet Take 20 mg by mouth daily as needed for erectile dysfunction      Turmeric 500 MG CAPS Take 1,000 mg by mouth daily      VITAMIN D, CHOLECALCIFEROL, PO Take by mouth      gentamicin-prednisoLONE (PRED-G) 0 3-1 % ophthalmic drops Administer 1 drop to the right eye 2 (two) times a day for 10 days 5 mL 0    neomycin-bacitracin-polymyxin (NEOSPORIN) 5-400-5,000 ointment Apply topically 4 (four) times a day for 7 days 28 3 g 0    phenazopyridine (PYRIDIUM) 200 mg tablet Take 1 tablet (200 mg total) by mouth 3 (three) times a day 6 tablet 0     No current facility-administered medications for this visit  Allergies  Allergies   Allergen Reactions    Other      Animal dander       Past Medical History, Social History, Family History, medications and allergies were reviewed  Review of Systems  Review of Systems   Constitutional: Negative  HENT: Negative  Eyes: Negative  Respiratory: Negative  Cardiovascular: Negative  Gastrointestinal: Negative  Endocrine: Negative  Genitourinary: Positive for dysuria and urgency  Negative for decreased urine volume, difficulty urinating and hematuria  Musculoskeletal: Negative  Skin: Negative  Neurological: Negative  Hematological: Negative  Psychiatric/Behavioral: Negative  Vitals  Vitals:    06/12/18 0811   BP: 140/90   BP Location: Left arm   Patient Position: Sitting   Cuff Size: Adult   Pulse: 62   Weight: 98 2 kg (216 lb 6 4 oz)   Height: 5' 8" (1 727 m)         Physical Exam    Physical Exam   Constitutional: He is oriented to person, place, and time  He appears well-developed and well-nourished  HENT:   Head: Normocephalic and atraumatic  Eyes: Pupils are equal, round, and reactive to light  Neck: Normal range of motion  Cardiovascular: Normal rate, regular rhythm and normal heart sounds  Pulmonary/Chest: Effort normal and breath sounds normal  No accessory muscle usage  No respiratory distress  Abdominal: Soft  Normal appearance and bowel sounds are normal  There is no tenderness  Genitourinary:   Genitourinary Comments: Prostate examination deferred today secondary to active UTI  Musculoskeletal: Normal range of motion  Neurological: He is alert and oriented to person, place, and time  Skin: Skin is warm, dry and intact  Psychiatric: He has a normal mood and affect  His speech is normal  Cognition and memory are normal    Nursing note and vitals reviewed        Results    Below listed labs, pathology results, and radiology images were personally reviewed:    Lab Results   Component Value Date/Time    PSA 1 3 06/07/2017 07:53 AM    PSA 1 2 06/05/2014 08:25 AM     Lab Results   Component Value Date    GLUCOSE 101 06/08/2018    CALCIUM 9 2 06/08/2018     06/08/2018    K 4 1 06/08/2018    CO2 28 06/08/2018     06/08/2018    BUN 12 06/08/2018    CREATININE 0 89 06/08/2018     Lab Results   Component Value Date    WBC 11 10 (H) 06/08/2018    HGB 14 9 06/08/2018    HCT 42 9 06/08/2018    MCV 92 06/08/2018     06/08/2018       No results found for this or any previous visit (from the past 1 hour(s)) ]    CT ABDOMEN AND PELVIS WITHOUT IV CONTRAST - LOW DOSE RENAL STONE      INDICATION:   Difficulty starting urinary stream few weeks, sensation of incomplete void x1 week, dysuria and hematuria X1d  Painful urinating  States blood and mucus in urine  Worsening symptoms for one week      COMPARISON:  None      TECHNIQUE:  Low dose thin section CT examination of the abdomen and pelvis was performed without intravenous or oral contrast according to a protocol specifically designed to evaluate for urinary tract calculus  Axial, sagittal, and coronal 2D   reformatted images were created from the source data and submitted for interpretation  Evaluation for pathology in the abdomen and pelvis that is unrelated to urinary tract calculi is limited       Radiation dose length product (DLP) for this visit:  413 mGy-cm   This examination, like all CT scans performed in the [facility], was performed utilizing techniques to minimize radiation dose exposure, including the use of iterative   reconstruction and automated exposure control       FINDINGS:     RIGHT KIDNEY AND URETER:  No urinary tract calculi  No hydronephrosis or hydroureter      LEFT KIDNEY AND URETER:  No urinary tract calculi  No hydronephrosis or hydroureter      URINARY BLADDER:   There is circumferential bladder wall thickening    Moderate amount of inflammatory changes surrounding the urinary bladder  There is elevation of the floor of the urinary bladder, secondary to a mildly enlarged prostate gland  No discrete cleavage   plane with the underlying prostate gland      Prostate gland mildly enlarged  The prostate gland is heterogeneous in CT density      No significant abnormality in the visualized lung bases      The liver is mildly enlarged with fatty infiltrative changes  No space-occupying lesions      Diffuse calcifications within the adrenal glands, likely due to prior hemorrhage or infection      Limited low radiation dose noncontrast CT evaluation demonstrates no clinically significant abnormality of spleen or pancreas      No calcified gallstones or gallbladder wall thickening noted      No ascites or bulky lymphadenopathy on this limited noncontrast study      Evaluation of the GI tract limited due to lack of oral contrast material      Stomach decompressed  Hiatal hernia      No evidence of small bowel obstruction      Scattered diverticula throughout the entire colon  Nothing to suggest acute diverticulitis      Limited evaluation demonstrates no evidence to suggest acute appendicitis      No acute fracture or destructive osseous lesion is identified  Degenerative changes in the lumbar spine, most pronounced L4-L5 and Z0-X2      Small umbilical hernia containing fat  Small bilateral inguinal hernias containing fat      IMPRESSION:  1  No obstructing renal calculi  2   Diffuse abnormal appearance of the urinary bladder  Although the findings may be related to sequela of prostatic enlargement and bladder outlet obstruction, cystitis or bladder neoplasm not excluded  Recommend follow-up urologic consultation  3   Colonic diverticulosis    4   Hepatomegaly with fatty infiltration      The study was marked in EPIC for immediate notification      Workstation performed: [id number]      Imaging     CT renal stone study abdomen pelvis without contrast (Order #[id number]) on 6/8/2018 - Imaging Information    Component      Latest Ref Rng & Units 6/8/2018   RBC, UA      None Seen, 0-5 /hpf Innumerable (A)   WBC, UA      None Seen, 0-5, 5-55, 5-65 /hpf Innumerable (A)   Epithelial Cells      None Seen, Occasional /hpf None Seen   Bacteria, UA      None Seen, Occasional /hpf None Seen   OTHER OBSERVATIONS       WBCs Clumped             Lab Results   Component Value Date/Time    PSA 1 3 06/07/2017 07:53 AM    PSA 1 2 06/05/2014 08:25 AM       Post Void Residual Measurement:  After voiding to completion the patient was brought to the exam room and positioned supine    Residual urine volume was measured with an ultrasound at:    240 ml

## 2018-08-02 LAB — PSA SERPL-MCNC: 1.4 NG/ML (ref 0–4)

## 2018-08-14 ENCOUNTER — OFFICE VISIT (OUTPATIENT)
Dept: UROLOGY | Facility: CLINIC | Age: 63
End: 2018-08-14
Payer: COMMERCIAL

## 2018-08-14 VITALS
HEIGHT: 68 IN | SYSTOLIC BLOOD PRESSURE: 128 MMHG | WEIGHT: 214.2 LBS | BODY MASS INDEX: 32.46 KG/M2 | HEART RATE: 72 BPM | DIASTOLIC BLOOD PRESSURE: 84 MMHG

## 2018-08-14 DIAGNOSIS — Z12.5 PROSTATE CANCER SCREENING: ICD-10-CM

## 2018-08-14 DIAGNOSIS — N32.89 BLADDER WALL THICKENING: ICD-10-CM

## 2018-08-14 DIAGNOSIS — N30.01 ACUTE CYSTITIS WITH HEMATURIA: Primary | ICD-10-CM

## 2018-08-14 LAB — POST-VOID RESIDUAL VOLUME, ML POC: 47 ML

## 2018-08-14 PROCEDURE — 99213 OFFICE O/P EST LOW 20 MIN: CPT | Performed by: PHYSICIAN ASSISTANT

## 2018-08-14 PROCEDURE — 51798 US URINE CAPACITY MEASURE: CPT | Performed by: PHYSICIAN ASSISTANT

## 2018-08-14 PROCEDURE — 51741 ELECTRO-UROFLOWMETRY FIRST: CPT | Performed by: PHYSICIAN ASSISTANT

## 2018-08-14 NOTE — PROGRESS NOTES
1  Acute cystitis with hematuria     2  Prostate cancer screening     3  Bladder wall thickening  Cystoscopy     Assessment and plan:       1  BPH with LUTS - managed by Dr Mandie Tran  - patient is demonstrating better bladder emptying in the office today with 47 mL PVR  - patient continues to have mild lower urinary tract symptoms  We reviewed treatment options with alpha blockade  Patient defers management at this time as he is comfortable with his urination  I reviewed with the patient that should he have another urinary infection this would be indication to initiate medication  - patient will follow up for cystoscopy for further evaluation of bladder wall thickening  2  Routine prostate cancer screening  - will attemp to obtain patient's most recent PSA  -  Prostate examination in the office today was without abnormalities  Loanne Mcburney, PA-C      Chief Complaint     F/u LUTS    History of Present Illness     April Marshall is a 65 y o  male patient of Dr Mandie Tran with a history of urinary tract infection with hematuria presenting for follow-up  Patient was seen in the ER for dysuria, frequency, urgency, and gross heamturia  Urine culture was positive for urinary infection  CT did reveal bladder wall thickening which could be correlated to bed correlated with the concurrent urinary infection versus bladder outlet obstruction versus underlying bladder neoplasm  Patient had an elevated postvoid residual at his last visit at 240 cc  Patient deferred management with alpha blockade at that time however  He presents today for reassessment  Patient feels like he has a weak stream, however feels empty after urination  Has nocturia 1 time nightly  Does admit to urinary frequency  Denies any dysuria, continued gross hematuria, suprapubic pressure, flank pain, fevers, chills, bone pain, weight loss        Patient states he had a PSA prior to his appointment today, however unfortunately do not have the results  Previous PSA was 1 3 (6/7/17)  Uroflow reveals a voided volume of 51 mL, peak flow 7 mL/sec, average flow 5 4 mL/sec  Postvoid residual reveals 47 mL  Laboratory     Lab Results   Component Value Date    CREATININE 0 89 06/08/2018       Lab Results   Component Value Date    PSA 1 3 06/07/2017    PSA 1 2 06/05/2014       Review of Systems     Review of Systems   Constitutional: Negative for activity change, appetite change, chills, diaphoresis, fatigue, fever and unexpected weight change  Respiratory: Negative for chest tightness and shortness of breath  Cardiovascular: Negative for chest pain, palpitations and leg swelling  Gastrointestinal: Negative for abdominal distention, abdominal pain, constipation, diarrhea, nausea and vomiting  Genitourinary: Positive for frequency  Negative for decreased urine volume, difficulty urinating, dysuria, enuresis, flank pain, genital sores, hematuria and urgency  Weak urinary stream, nocturia x1   Musculoskeletal: Negative for back pain, gait problem and myalgias  Skin: Negative for color change, pallor, rash and wound  Psychiatric/Behavioral: Negative for behavioral problems  The patient is not nervous/anxious  AUA SYMPTOM SCORE      Most Recent Value   AUA SYMPTOM SCORE   How often have you had a sensation of not emptying your bladder completely after you finished urinating? 1   How often have you had to urinate again less than two hours after you finished urinating? 3   How often have you found you stopped and started again several times when you urinate? 1   How often have you found it difficult to postpone urination? 4   How often have you had a weak urinary stream?  3   How often have you had to push or strain to begin urination? 2   How many times did you most typically get up to urinate from the time you went to bed at night until the time you got up in the morning? 2   Quality of Life:  If you were to spend the rest of your life with your urinary condition just the way it is now, how would you feel about that?  3   AUA SYMPTOM SCORE  16            Allergies     Allergies   Allergen Reactions    Other      Animal dander       Physical Exam     Physical Exam   Constitutional: He is oriented to person, place, and time  He appears well-developed and well-nourished  No distress  HENT:   Head: Normocephalic and atraumatic  Eyes: Conjunctivae are normal    Neck: Normal range of motion  No tracheal deviation present  Pulmonary/Chest: Effort normal    Genitourinary:   Genitourinary Comments: External nonthrombosed hemorrhoid  Prostate 25-30g, smooth, no nodules   Musculoskeletal: Normal range of motion  He exhibits no edema or deformity  Neurological: He is alert and oriented to person, place, and time  Skin: Skin is warm and dry  No rash noted  He is not diaphoretic  No erythema  Psychiatric: He has a normal mood and affect   His behavior is normal          Vital Signs     Vitals:    08/14/18 0835   BP: 128/84   BP Location: Left arm   Patient Position: Sitting   Cuff Size: Adult   Pulse: 72   Weight: 97 2 kg (214 lb 3 2 oz)   Height: 5' 8" (1 727 m)         Current Medications       Current Outpatient Prescriptions:     aspirin (ECOTRIN LOW STRENGTH) 81 mg EC tablet, Take 81 mg by mouth daily, Disp: , Rfl:     folic acid (FOLVITE) 348 mcg tablet, Take 400 mcg by mouth daily, Disp: , Rfl:     mometasone (ASMANEX 120 METERED DOSES) 220 MCG/INH inhaler, Inhale, Disp: , Rfl:     Multiple Vitamin (MULTIVITAMIN) tablet, Take 1 tablet by mouth daily, Disp: , Rfl:     Probiotic Product (PROBIOTIC DAILY PO), Take by mouth, Disp: , Rfl:     tadalafil (CIALIS) 20 MG tablet, Take 20 mg by mouth daily as needed for erectile dysfunction, Disp: , Rfl:     Turmeric 500 MG CAPS, Take 1,000 mg by mouth daily, Disp: , Rfl:     VITAMIN D, CHOLECALCIFEROL, PO, Take by mouth, Disp: , Rfl:     fexofenadine (ALLEGRA) 180 MG tablet, Take 1 tablet (180 mg total) by mouth daily, Disp: 30 tablet, Rfl: 0    gentamicin-prednisoLONE (PRED-G) 0 3-1 % ophthalmic drops, Administer 1 drop to the right eye 2 (two) times a day for 10 days, Disp: 5 mL, Rfl: 0    neomycin-bacitracin-polymyxin (NEOSPORIN) 5-400-5,000 ointment, Apply topically 4 (four) times a day for 7 days, Disp: 28 3 g, Rfl: 0    phenazopyridine (PYRIDIUM) 200 mg tablet, Take 1 tablet (200 mg total) by mouth 3 (three) times a day, Disp: 6 tablet, Rfl: 0      Active Problems     Patient Active Problem List   Diagnosis    Superior glenoid labrum lesion of left shoulder    Urinary tract infection with hematuria    Prostate cancer screening    Bladder wall thickening         Past Medical History     Past Medical History:   Diagnosis Date    Asthma     GERD (gastroesophageal reflux disease)          Surgical History     Past Surgical History:   Procedure Laterality Date    HERNIA REPAIR      KNEE SURGERY      AK SHLDR ARTHROSCOP,SURG,REPAIR,SLAP LESION Left 5/2/2017    Procedure: ARTHROSCOPY SHOULDER W/ bicep tendodesis, SAD ;  Surgeon: Stefan Mera MD;  Location: [facility];  Service: Orthopedics         Family History     Family History   Problem Relation Age of Onset    No Known Problems Mother     No Known Problems Father          Social History     Social History       Radiology     CT ABDOMEN AND PELVIS WITHOUT IV CONTRAST - LOW DOSE RENAL STONE      INDICATION:   Difficulty starting urinary stream few weeks, sensation of incomplete void x1 week, dysuria and hematuria X1d  Painful urinating  States blood and mucus in urine  Worsening symptoms for one week      COMPARISON:  None      TECHNIQUE:  Low dose thin section CT examination of the abdomen and pelvis was performed without intravenous or oral contrast according to a protocol specifically designed to evaluate for urinary tract calculus    Axial, sagittal, and coronal 2D   reformatted images were created from the source data and submitted for interpretation  Evaluation for pathology in the abdomen and pelvis that is unrelated to urinary tract calculi is limited       Radiation dose length product (DLP) for this visit:  413 mGy-cm   This examination, like all CT scans performed in the [facility], was performed utilizing techniques to minimize radiation dose exposure, including the use of iterative   reconstruction and automated exposure control       FINDINGS:     RIGHT KIDNEY AND URETER:  No urinary tract calculi  No hydronephrosis or hydroureter      LEFT KIDNEY AND URETER:  No urinary tract calculi  No hydronephrosis or hydroureter      URINARY BLADDER:   There is circumferential bladder wall thickening  Moderate amount of inflammatory changes surrounding the urinary bladder  There is elevation of the floor of the urinary bladder, secondary to a mildly enlarged prostate gland  No discrete cleavage   plane with the underlying prostate gland      Prostate gland mildly enlarged  The prostate gland is heterogeneous in CT density      No significant abnormality in the visualized lung bases      The liver is mildly enlarged with fatty infiltrative changes  No space-occupying lesions      Diffuse calcifications within the adrenal glands, likely due to prior hemorrhage or infection      Limited low radiation dose noncontrast CT evaluation demonstrates no clinically significant abnormality of spleen or pancreas      No calcified gallstones or gallbladder wall thickening noted      No ascites or bulky lymphadenopathy on this limited noncontrast study      Evaluation of the GI tract limited due to lack of oral contrast material      Stomach decompressed  Hiatal hernia      No evidence of small bowel obstruction      Scattered diverticula throughout the entire colon    Nothing to suggest acute diverticulitis      Limited evaluation demonstrates no evidence to suggest acute appendicitis      No acute fracture or destructive osseous lesion is identified  Degenerative changes in the lumbar spine, most pronounced L4-L5 and A4-Y9      Small umbilical hernia containing fat  Small bilateral inguinal hernias containing fat      IMPRESSION:  1  No obstructing renal calculi  2   Diffuse abnormal appearance of the urinary bladder  Although the findings may be related to sequela of prostatic enlargement and bladder outlet obstruction, cystitis or bladder neoplasm not excluded  Recommend follow-up urologic consultation  3   Colonic diverticulosis  4   Hepatomegaly with fatty infiltration

## 2018-09-10 NOTE — PROGRESS NOTES
Cystoscopy  Date/Time: 9/14/2018 12:37 PM  Performed by: Rachael Ramos by: Henry Ku     Procedure details: cystoscopy    Patient tolerance: Patient tolerated the procedure well with no immediate complications          Ligia Hernandez is a 65 y o  male seen today in regards to a history of urinary tract infection  Symptoms began rather acutely last week with increasing dysuria, frequency, urgency, and scant bloody discharge  He was evaluated in the emergency room where upon a urinary tract infection was diagnosed culture growing Staph  CT scan was otherwise normal except for diffuse bladder wall thickening  Patient denies significant preceding urinary complaints  No history of nephrolithiasis, gross hematuria, or prior urinary tract infections  Denies any prior genitourinary surgery history  Symptoms are gradually resolving currently  PROCEDURE- CYSTOSCOPY    With the patient properly identified and informed consent obtained he was placed supine in the procedure suite  He was sterilely prepped and draped in the usual fashion  10 cc of viscous lidocaine was administered per urethra  Flexible cystourethroscopy was performed with the 16 [address] Jocelyn scope  This revealed a normal pendulous urethra  Prostate was +2 enlarged  Kissing lobes with calcifications noted  Bladder otherwise moderately trabeculated  Inspection of the bladder revealed no evidence of bladder tumor  Patient tolerated the procedure well  PLAN    Cystoscopy is consistent with BPH  Patient denies significant symptoms at this time and therefore will be observed  He will return in 1 year with ultrasound postvoid check and PSA along with urinalysis prior to visit  He is to contact office should symptoms develop  All questions answered at this time

## 2018-09-14 ENCOUNTER — PROCEDURE VISIT (OUTPATIENT)
Dept: UROLOGY | Facility: CLINIC | Age: 63
End: 2018-09-14
Payer: COMMERCIAL

## 2018-09-14 DIAGNOSIS — N30.01 ACUTE CYSTITIS WITH HEMATURIA: Primary | ICD-10-CM

## 2018-09-14 DIAGNOSIS — Z12.5 PROSTATE CANCER SCREENING: ICD-10-CM

## 2018-09-14 DIAGNOSIS — N32.89 BLADDER WALL THICKENING: ICD-10-CM

## 2018-09-14 PROCEDURE — 52000 CYSTOURETHROSCOPY: CPT | Performed by: UROLOGY

## 2018-09-27 ENCOUNTER — APPOINTMENT (OUTPATIENT)
Dept: RADIOLOGY | Age: 63
End: 2018-09-27
Payer: COMMERCIAL

## 2018-09-27 ENCOUNTER — TRANSCRIBE ORDERS (OUTPATIENT)
Dept: ADMINISTRATIVE | Age: 63
End: 2018-09-27

## 2018-09-27 DIAGNOSIS — M77.41 METATARSALGIA OF RIGHT FOOT: ICD-10-CM

## 2018-09-27 DIAGNOSIS — G57.61 LATERAL PLANTAR NEUROPATHY, RIGHT: Primary | ICD-10-CM

## 2018-09-27 DIAGNOSIS — G57.61 LATERAL PLANTAR NEUROPATHY, RIGHT: ICD-10-CM

## 2018-09-27 PROCEDURE — 73630 X-RAY EXAM OF FOOT: CPT

## 2018-10-10 ENCOUNTER — OFFICE VISIT (OUTPATIENT)
Dept: FAMILY MEDICINE CLINIC | Facility: CLINIC | Age: 63
End: 2018-10-10
Payer: COMMERCIAL

## 2018-10-10 VITALS
SYSTOLIC BLOOD PRESSURE: 128 MMHG | RESPIRATION RATE: 16 BRPM | DIASTOLIC BLOOD PRESSURE: 80 MMHG | HEART RATE: 80 BPM | BODY MASS INDEX: 32.01 KG/M2 | HEIGHT: 68 IN | WEIGHT: 211.2 LBS | TEMPERATURE: 97.4 F

## 2018-10-10 DIAGNOSIS — Z01.818 PRE-OP EXAMINATION: Primary | ICD-10-CM

## 2018-10-10 DIAGNOSIS — N32.89 BLADDER WALL THICKENING: ICD-10-CM

## 2018-10-10 DIAGNOSIS — E66.9 OBESITY (BMI 30.0-34.9): ICD-10-CM

## 2018-10-10 DIAGNOSIS — K21.9 GASTROESOPHAGEAL REFLUX DISEASE WITHOUT ESOPHAGITIS: ICD-10-CM

## 2018-10-10 DIAGNOSIS — J45.30 MILD PERSISTENT ASTHMA IN ADULT WITHOUT COMPLICATION: ICD-10-CM

## 2018-10-10 DIAGNOSIS — F52.21 ERECTILE DYSFUNCTION OF NON-ORGANIC ORIGIN: ICD-10-CM

## 2018-10-10 PROBLEM — Z12.5 PROSTATE CANCER SCREENING: Status: RESOLVED | Noted: 2018-08-14 | Resolved: 2018-10-10

## 2018-10-10 PROBLEM — N39.0 URINARY TRACT INFECTION WITH HEMATURIA: Status: RESOLVED | Noted: 2018-06-12 | Resolved: 2018-10-10

## 2018-10-10 PROBLEM — R31.9 URINARY TRACT INFECTION WITH HEMATURIA: Status: RESOLVED | Noted: 2018-06-12 | Resolved: 2018-10-10

## 2018-10-10 PROBLEM — E66.811 OBESITY (BMI 30.0-34.9): Status: ACTIVE | Noted: 2018-10-10

## 2018-10-10 PROBLEM — S43.432A SUPERIOR GLENOID LABRUM LESION OF LEFT SHOULDER: Status: RESOLVED | Noted: 2017-05-02 | Resolved: 2018-10-10

## 2018-10-10 PROCEDURE — 99214 OFFICE O/P EST MOD 30 MIN: CPT | Performed by: FAMILY MEDICINE

## 2018-10-10 PROCEDURE — 1036F TOBACCO NON-USER: CPT | Performed by: FAMILY MEDICINE

## 2018-10-10 NOTE — PROGRESS NOTES
PRE-OPERATIVE EVALUATION  [facility]    NAME: Gonzalo Montana  AGE: 59 y o  SEX: male  : 1955     DATE: 10/10/2018    Family Medicine Pre-Operative Evaluation      Chief Complaint: Pre-operative Evaluation     Surgery: Right Foot Surgery-exploratory for neuropathy  Anticipated Date of Surgery: 10/17/2018  Referring Provider: Dr Parul Olmedo     History of Present Illness:     Jen Mcginnis is a 59 y o  male who presents to the office today for a preoperative consultation at the request of the surgeon for the procedure above  Pt has been having right foot neuropathy type pain and the surgeons are doing an exploratory procedure per the patient  Current anti-platelet/anti-coagulation medications that the patient is prescribed includes: Aspirin  Assessment of Chronic Conditions:   - Asthma: on Asmanex daily     Assessment of Cardiac Risk:  · Denies unstable or severe angina or MI in the last 6 weeks or history of stent placement in the last year   · Denies decompensated heart failure (e g  New onset heart failure, NYHA functional class IV heart failure, or worsening existing heart failure)  · Denies significant arrhythmias such as high grade AV block, symptomatic ventricular arrhythmia, newly recognized ventricular tachycardia, supraventricular tachycardia with resting heart rate >100, or symptomatic bradycardia  · Denies severe heart valve disease including aortic stenosis or symptomatic mitral stenosis     Exercise Capacity:  · Able to walk 4 blocks without symptoms?: Yes  · Able to walk 2 flights without symptoms?: Yes    Prior Anesthesia Reactions: Yes     Personal history of venous thromboembolic disease? No    Review of Systems:     Review of Systems   Constitutional: Negative for chills and fever  HENT: Negative for ear pain  Eyes: Negative for visual disturbance (has cataracts)  Respiratory: Negative for cough and shortness of breath      Cardiovascular: Negative for chest pain and palpitations  Gastrointestinal: Negative for abdominal pain, diarrhea, nausea and vomiting  Musculoskeletal: Positive for arthralgias and gait problem  Skin: Negative for rash  Allergic/Immunologic: Positive for environmental allergies  Current Problem List:     Patient Active Problem List   Diagnosis    Bladder wall thickening    Asthma in adult without complication    Erectile dysfunction of non-organic origin    Gastroesophageal reflux disease without esophagitis    Obesity (BMI 30 0-34  9)       Allergies:      Allergies   Allergen Reactions    Other      Animal dander       Medications:       Current Outpatient Prescriptions:     aspirin (ECOTRIN LOW STRENGTH) 81 mg EC tablet, Take 81 mg by mouth daily, Disp: , Rfl:     folic acid (FOLVITE) 460 mcg tablet, Take 400 mcg by mouth daily, Disp: , Rfl:     mometasone (ASMANEX 120 METERED DOSES) 220 MCG/INH inhaler, Inhale, Disp: , Rfl:     Multiple Vitamin (MULTIVITAMIN) tablet, Take 1 tablet by mouth daily, Disp: , Rfl:     Probiotic Product (PROBIOTIC DAILY PO), Take by mouth, Disp: , Rfl:     Turmeric 500 MG CAPS, Take 1,000 mg by mouth daily, Disp: , Rfl:     VITAMIN D, CHOLECALCIFEROL, PO, Take by mouth, Disp: , Rfl:     tadalafil (CIALIS) 20 MG tablet, Take 20 mg by mouth daily as needed for erectile dysfunction, Disp: , Rfl:     Past Medical History:       Past Medical History:   Diagnosis Date    Asthma     Crushing injury of hand and fingers 10/22/2012    Crush injury of fingers of right hand    Erectile dysfunction of non-organic origin     GERD (gastroesophageal reflux disease)     Shoulder joint pain 01/30/2013    Unspecified laterality    Umbilical hernia         Past Surgical History:   Procedure Laterality Date    HERNIA REPAIR      KNEE SURGERY      [place] SHLDR ARTHROSCOP,SURG,REPAIR,SLAP LESION Left 5/2/2017    Procedure: ARTHROSCOPY SHOULDER W/ bicep tendodesis, SAD ;  Surgeon: Duran Raymond MD; Location: [facility];  Service: Orthopedics        Family History   Problem Relation Age of Onset    No Known Problems Mother     No Known Problems Father     No Known Problems Family         Social History     Social History    Marital status:      Spouse name: N/A    Number of children: N/A    Years of education: N/A     Occupational History    Not on file  Social History Main Topics    Smoking status: Never Smoker    Smokeless tobacco: Never Used      Comment: Former smoker per [employer]    Alcohol use Yes      Comment: 'couple glasses' wine/daily     Drug use: No    Sexual activity: Not on file     Other Topics Concern    Not on file     Social History Narrative    Drinks coffee        Physical Exam:     /80 (BP Location: Left arm, Patient Position: Sitting, Cuff Size: Large)   Pulse 80   Temp (!) 97 4 °F (36 3 °C)   Resp 16   Ht 5' 8" (1 727 m)   Wt 95 8 kg (211 lb 3 2 oz)   BMI 32 11 kg/m²     Physical Exam   Constitutional: He is oriented to person, place, and time  He appears well-developed and well-nourished  No distress  obese   HENT:   Head: Normocephalic and atraumatic  Mouth/Throat: Oropharynx is clear and moist  No oropharyngeal exudate  Wears glasses   Eyes: Pupils are equal, round, and reactive to light  EOM are normal  Right eye exhibits no discharge  Left eye exhibits no discharge  Neck: Normal range of motion  Neck supple  No JVD present  Cardiovascular: Normal rate, regular rhythm and normal heart sounds  No murmur heard  Pulmonary/Chest: Effort normal and breath sounds normal  No stridor  No respiratory distress  He has no wheezes  Abdominal: Soft  Bowel sounds are normal  There is no tenderness  There is no rebound and no guarding  Musculoskeletal: Normal range of motion  He exhibits no edema or tenderness  Neurological: He is alert and oriented to person, place, and time  Skin: Skin is warm and dry  He is not diaphoretic  No erythema  Psychiatric: He has a normal mood and affect  His behavior is normal    Vitals reviewed  Data:     Pre-operative work-up    Laboratory Results: I have personally reviewed the pertinent laboratory results/reports   Lab Results   Component Value Date    CREATININE 0 89 06/08/2018         Assessment & Recommendations:     Pre-Op Evaluation Assessment  Cardiac Risk Estimation: per the Revised Cardiac Risk Index (Circ  [id number], 1999), the patient's risk factors for cardiac complications include none, putting him in: RCI RISK CLASS I (0 risk factors, risk of major cardiac compl  appr  0 5%)  Ada Mask is undergoing an elective Minimal risk surgery  They are at [address] risk for major adverse cardiac event (MACE)  They may proceed with surgery as planned without further workup  Pre-Op Evaluation Plan  1  Further preoperative workup as follows:   - None; no further preoperative work-up is required    2  Medication Management/Recommendations:   - Patient has been instructed to avoid herbs or non-directed vitamins the week prior to surgery to ensure no drug interactions with perioperative surgical and anesthetic medications  - Patient has been instructed to avoid aspirin containing medications or non-steroidal anti-inflammatory drugs for the week preceding surgery  3  Patient requires further consultation with: None    4  Other Problems Addressed Today:  Asthma in adult without complication  Very well controlled with Asmanex, f/w Allergist and doesn't use his rescue inhaler much    Bladder wall thickening  Saw Urology after UTI and was "cleared" for yearly visits now    Erectile dysfunction of non-organic origin  Uses Cialis PRN    Gastroesophageal reflux disease without esophagitis  No longer on PPI, doing well with dietary modifications    Obesity (BMI 30 0-34  9)  BMI Readings from Last 3 Encounters:   10/10/18 32 11 kg/m²   08/14/18 32 57 kg/m²   06/12/18 32 90 kg/m²          Erica Velez, MD  [facility] [place]  [id number]  [place] [address]  Phone#  [phone]  Fax#  [phone]

## 2018-10-10 NOTE — ASSESSMENT & PLAN NOTE
BMI Readings from Last 3 Encounters:   10/10/18 32 11 kg/m²   08/14/18 32 57 kg/m²   06/12/18 32 90 kg/m²

## 2019-04-08 ENCOUNTER — OFFICE VISIT (OUTPATIENT)
Dept: FAMILY MEDICINE CLINIC | Facility: CLINIC | Age: 64
End: 2019-04-08
Payer: COMMERCIAL

## 2019-04-08 VITALS
HEART RATE: 80 BPM | DIASTOLIC BLOOD PRESSURE: 86 MMHG | SYSTOLIC BLOOD PRESSURE: 128 MMHG | WEIGHT: 218 LBS | HEIGHT: 68 IN | TEMPERATURE: 97.6 F | BODY MASS INDEX: 33.04 KG/M2

## 2019-04-08 DIAGNOSIS — H25.013 CORTICAL AGE-RELATED CATARACT OF BOTH EYES: ICD-10-CM

## 2019-04-08 DIAGNOSIS — Z01.818 PRE-OP EXAM: Primary | ICD-10-CM

## 2019-04-08 PROCEDURE — 99242 OFF/OP CONSLTJ NEW/EST SF 20: CPT | Performed by: NURSE PRACTITIONER

## 2019-04-09 ENCOUNTER — TELEPHONE (OUTPATIENT)
Dept: FAMILY MEDICINE CLINIC | Facility: CLINIC | Age: 64
End: 2019-04-09

## 2019-09-10 ENCOUNTER — APPOINTMENT (OUTPATIENT)
Dept: LAB | Age: 64
End: 2019-09-10
Payer: COMMERCIAL

## 2019-09-10 DIAGNOSIS — N32.89 BLADDER WALL THICKENING: ICD-10-CM

## 2019-09-10 DIAGNOSIS — Z12.5 PROSTATE CANCER SCREENING: ICD-10-CM

## 2019-09-10 LAB
BACTERIA UR QL AUTO: ABNORMAL /HPF
BILIRUB UR QL STRIP: NEGATIVE
CLARITY UR: CLEAR
COLOR UR: YELLOW
GLUCOSE UR STRIP-MCNC: NEGATIVE MG/DL
HGB UR QL STRIP.AUTO: NEGATIVE
HYALINE CASTS #/AREA URNS LPF: ABNORMAL /LPF
KETONES UR STRIP-MCNC: NEGATIVE MG/DL
LEUKOCYTE ESTERASE UR QL STRIP: NEGATIVE
NITRITE UR QL STRIP: NEGATIVE
NON-SQ EPI CELLS URNS QL MICRO: ABNORMAL /HPF
PH UR STRIP.AUTO: 7.5 [PH]
PROT UR STRIP-MCNC: NEGATIVE MG/DL
PSA SERPL-MCNC: 1.2 NG/ML (ref 0–4)
RBC #/AREA URNS AUTO: ABNORMAL /HPF
SP GR UR STRIP.AUTO: 1.02 (ref 1–1.03)
UROBILINOGEN UR QL STRIP.AUTO: 0.2 E.U./DL
WBC #/AREA URNS AUTO: ABNORMAL /HPF

## 2019-09-10 PROCEDURE — 81001 URINALYSIS AUTO W/SCOPE: CPT

## 2019-09-10 PROCEDURE — 84153 ASSAY OF PSA TOTAL: CPT

## 2019-09-16 ENCOUNTER — OFFICE VISIT (OUTPATIENT)
Dept: UROLOGY | Facility: CLINIC | Age: 64
End: 2019-09-16
Payer: COMMERCIAL

## 2019-09-16 VITALS
HEART RATE: 87 BPM | WEIGHT: 214 LBS | SYSTOLIC BLOOD PRESSURE: 150 MMHG | HEIGHT: 68 IN | DIASTOLIC BLOOD PRESSURE: 57 MMHG | BODY MASS INDEX: 32.43 KG/M2

## 2019-09-16 DIAGNOSIS — N30.01 ACUTE CYSTITIS WITH HEMATURIA: Primary | ICD-10-CM

## 2019-09-16 DIAGNOSIS — Z12.5 PROSTATE CANCER SCREENING: ICD-10-CM

## 2019-09-16 LAB — POST-VOID RESIDUAL VOLUME, ML POC: 48 ML

## 2019-09-16 PROCEDURE — 51741 ELECTRO-UROFLOWMETRY FIRST: CPT | Performed by: PHYSICIAN ASSISTANT

## 2019-09-16 PROCEDURE — 51798 US URINE CAPACITY MEASURE: CPT | Performed by: PHYSICIAN ASSISTANT

## 2019-09-16 PROCEDURE — 99213 OFFICE O/P EST LOW 20 MIN: CPT | Performed by: PHYSICIAN ASSISTANT

## 2019-09-16 NOTE — PROGRESS NOTES
1  Acute cystitis with hematuria  POCT Measure PVR   2  Prostate cancer screening  PSA, Total Screen     Assessment and plan:       1  BPH with LUTS - managed by Dr Mandie Tran  - bladder wall thickening a noncontrast CT in 2018 with cystoscopy 2018 showing prostatomegaly and bladder trabeculations  - patient is demonstrating better bladder emptying in the office today with 48 mL PVR  - comfortable with lower urinary tract symptoms at this time off of medical management  - encouraged to contact us should he have any worsening urination and wished to start tamsulosin  2  Routine prostate cancer screening  - PSA and prostate examination normal  - patient elects to continue routine prostate cancer screening with Urology  - follow-up 1 year PSA prior to visit for continued surveillance      Loanne Mcburney, PA-C      Chief Complaint     F/u LUTS    History of Present Illness     April Marshall is a 59 y o  male patient of Dr Mandie Tran with a history of urinary tract infection with hematuria presenting for follow-up  Patient was seen in the ER for dysuria, frequency, urgency, and gross heamturia  Urine culture was positive for urinary infection  CT did reveal bladder wall thickening which could be correlated to be correlated with the concurrent urinary infection versus bladder outlet obstruction versus underlying bladder neoplasm  Patient underwent cystoscopy 09/14/2018 which was consistent with prostatomegaly and bladder outlet obstruction with moderate trabeculations  No evidence of any lower urinary tract lesions at that time  Patient has deferred any therapy for urination  Over the course of the past year patient remains comfortable with his urination  Denies any dysuria, gross hematuria, suprapubic pressure, urinary infections  Last PSA 1 2 (9/10/19)  Uroflow reveals a bell curve with mild straightening at the end  Voided volume 103 mL, peak flow 12 mL/sec, average flow 7 3 mL/sec  Postvoid residual 48 mL  Laboratory     Lab Results   Component Value Date    CREATININE 0 89 06/08/2018       Lab Results   Component Value Date    PSA 1 2 09/10/2019    PSA 1 4 08/01/2018    PSA 1 3 06/07/2017       Review of Systems     Review of Systems   Constitutional: Negative for activity change, appetite change, chills, diaphoresis, fatigue, fever and unexpected weight change  Respiratory: Negative for chest tightness and shortness of breath  Cardiovascular: Negative for chest pain, palpitations and leg swelling  Gastrointestinal: Negative for abdominal distention, abdominal pain, constipation, diarrhea, nausea and vomiting  Genitourinary: Positive for frequency  Negative for decreased urine volume, difficulty urinating, dysuria, enuresis, flank pain, genital sores, hematuria and urgency  Weak urinary stream, nocturia x1   Musculoskeletal: Negative for back pain, gait problem and myalgias  Skin: Negative for color change, pallor, rash and wound  Psychiatric/Behavioral: Negative for behavioral problems  The patient is not nervous/anxious  Urinary Incontinence Screening      Most Recent Value   Urinary Incontinence   Urinary Incontinence? No   Incomplete emptying? No   Urinary frequency? Yes [only with beer/coffee]   Urinary urgency? Yes   Urinary hesitancy? Yes [sometimes]   Dysuria (painful difficult urination)? No   Nocturia (waking up to use the bathroom)? No   Straining (having to push to go)? No   Weak stream?  No   Intermittent stream?  No   Post void dribbling? No            Allergies     Allergies   Allergen Reactions    Other      Animal dander       Physical Exam     Physical Exam   Constitutional: He is oriented to person, place, and time  He appears well-developed and well-nourished  No distress  HENT:   Head: Normocephalic and atraumatic  Eyes: Conjunctivae are normal    Neck: Normal range of motion  No tracheal deviation present     Pulmonary/Chest: Effort normal    Genitourinary:   Genitourinary Comments: External nonthrombosed hemorrhoid  Prostate 30g, smooth, no nodules   Musculoskeletal: Normal range of motion  He exhibits no edema or deformity  Neurological: He is alert and oriented to person, place, and time  Skin: Skin is warm and dry  No rash noted  He is not diaphoretic  No erythema  Psychiatric: He has a normal mood and affect  His behavior is normal          Vital Signs     Vitals:    09/16/19 1013   BP: 150/57   BP Location: Left arm   Patient Position: Sitting   Cuff Size: Standard   Pulse: 87   Weight: 97 1 kg (214 lb)   Height: 5' 8" (1 727 m)         Current Medications       Current Outpatient Medications:     folic acid (FOLVITE) 751 mcg tablet, Take 400 mcg by mouth daily, Disp: , Rfl:     mometasone (ASMANEX 120 METERED DOSES) 220 MCG/INH inhaler, Inhale, Disp: , Rfl:     Multiple Vitamin (MULTIVITAMIN) tablet, Take 1 tablet by mouth daily, Disp: , Rfl:     Probiotic Product (PROBIOTIC DAILY PO), Take by mouth, Disp: , Rfl:     Turmeric 500 MG CAPS, Take 1,000 mg by mouth daily, Disp: , Rfl:     VITAMIN D, CHOLECALCIFEROL, PO, Take by mouth, Disp: , Rfl:       Active Problems     Patient Active Problem List   Diagnosis    Bladder wall thickening    Asthma in adult without complication    Erectile dysfunction of non-organic origin    Gastroesophageal reflux disease without esophagitis    Obesity (BMI 30 0-34  9)         Past Medical History     Past Medical History:   Diagnosis Date    Asthma     Crushing injury of hand and fingers 10/22/2012    Crush injury of fingers of right hand    Erectile dysfunction of non-organic origin     GERD (gastroesophageal reflux disease)     Shoulder joint pain 01/30/2013    Unspecified laterality    Umbilical hernia          Surgical History     Past Surgical History:   Procedure Laterality Date    FOOT SURGERY      HERNIA REPAIR      KNEE SURGERY      [facility] ARTHROSCOP,SURG,REPAIR,SLAP LESION Left 5/2/2017    Procedure: ARTHROSCOPY SHOULDER W/ bicep tendodesis, SAD ;  Surgeon: Neno Hernández MD;  Location: [facility];  Service: Orthopedics         Family History     Family History   Problem Relation Age of Onset    No Known Problems Mother     No Known Problems Father     No Known Problems Family          Social History     Social History       Radiology     CT ABDOMEN AND PELVIS WITHOUT IV CONTRAST - LOW DOSE RENAL STONE      INDICATION:   Difficulty starting urinary stream few weeks, sensation of incomplete void x1 week, dysuria and hematuria X1d  Painful urinating  States blood and mucus in urine  Worsening symptoms for one week      COMPARISON:  None      TECHNIQUE:  Low dose thin section CT examination of the abdomen and pelvis was performed without intravenous or oral contrast according to a protocol specifically designed to evaluate for urinary tract calculus  Axial, sagittal, and coronal 2D   reformatted images were created from the source data and submitted for interpretation  Evaluation for pathology in the abdomen and pelvis that is unrelated to urinary tract calculi is limited       Radiation dose length product (DLP) for this visit:  413 mGy-cm   This examination, like all CT scans performed in the [facility], was performed utilizing techniques to minimize radiation dose exposure, including the use of iterative   reconstruction and automated exposure control       FINDINGS:     RIGHT KIDNEY AND URETER:  No urinary tract calculi  No hydronephrosis or hydroureter      LEFT KIDNEY AND URETER:  No urinary tract calculi  No hydronephrosis or hydroureter      URINARY BLADDER:   There is circumferential bladder wall thickening  Moderate amount of inflammatory changes surrounding the urinary bladder  There is elevation of the floor of the urinary bladder, secondary to a mildly enlarged prostate gland    No discrete cleavage   plane with the underlying prostate gland      Prostate gland mildly enlarged  The prostate gland is heterogeneous in CT density      No significant abnormality in the visualized lung bases      The liver is mildly enlarged with fatty infiltrative changes  No space-occupying lesions      Diffuse calcifications within the adrenal glands, likely due to prior hemorrhage or infection      Limited low radiation dose noncontrast CT evaluation demonstrates no clinically significant abnormality of spleen or pancreas      No calcified gallstones or gallbladder wall thickening noted      No ascites or bulky lymphadenopathy on this limited noncontrast study      Evaluation of the GI tract limited due to lack of oral contrast material      Stomach decompressed  Hiatal hernia      No evidence of small bowel obstruction      Scattered diverticula throughout the entire colon  Nothing to suggest acute diverticulitis      Limited evaluation demonstrates no evidence to suggest acute appendicitis      No acute fracture or destructive osseous lesion is identified  Degenerative changes in the lumbar spine, most pronounced L4-L5 and V7-T5      Small umbilical hernia containing fat  Small bilateral inguinal hernias containing fat      IMPRESSION:  1  No obstructing renal calculi  2   Diffuse abnormal appearance of the urinary bladder  Although the findings may be related to sequela of prostatic enlargement and bladder outlet obstruction, cystitis or bladder neoplasm not excluded  Recommend follow-up urologic consultation  3   Colonic diverticulosis  4   Hepatomegaly with fatty infiltration

## 2019-10-21 ENCOUNTER — HOSPITAL ENCOUNTER (OUTPATIENT)
Dept: RADIOLOGY | Facility: HOSPITAL | Age: 64
Discharge: HOME | End: 2019-10-21
Attending: ORTHOPAEDIC SURGERY
Payer: COMMERCIAL

## 2019-10-21 ENCOUNTER — OFFICE VISIT (OUTPATIENT)
Dept: OBGYN CLINIC | Facility: HOSPITAL | Age: 64
End: 2019-10-21
Payer: COMMERCIAL

## 2019-10-21 VITALS
HEIGHT: 68 IN | DIASTOLIC BLOOD PRESSURE: 86 MMHG | HEART RATE: 70 BPM | SYSTOLIC BLOOD PRESSURE: 137 MMHG | WEIGHT: 218 LBS | BODY MASS INDEX: 33.04 KG/M2

## 2019-10-21 DIAGNOSIS — M75.41 SUBACROMIAL IMPINGEMENT OF RIGHT SHOULDER: Primary | ICD-10-CM

## 2019-10-21 DIAGNOSIS — M25.511 RIGHT SHOULDER PAIN, UNSPECIFIED CHRONICITY: ICD-10-CM

## 2019-10-21 PROCEDURE — 99214 OFFICE O/P EST MOD 30 MIN: CPT | Performed by: ORTHOPAEDIC SURGERY

## 2019-10-21 PROCEDURE — 73030 X-RAY EXAM OF SHOULDER: CPT

## 2019-10-21 PROCEDURE — 20610 DRAIN/INJ JOINT/BURSA W/O US: CPT | Performed by: ORTHOPAEDIC SURGERY

## 2019-10-21 RX ORDER — BETAMETHASONE SODIUM PHOSPHATE AND BETAMETHASONE ACETATE 3; 3 MG/ML; MG/ML
6 INJECTION, SUSPENSION INTRA-ARTICULAR; INTRALESIONAL; INTRAMUSCULAR; SOFT TISSUE
Status: COMPLETED | OUTPATIENT
Start: 2019-10-21 | End: 2019-10-21

## 2019-10-21 RX ORDER — BUPIVACAINE HYDROCHLORIDE 2.5 MG/ML
2 INJECTION, SOLUTION INFILTRATION; PERINEURAL
Status: COMPLETED | OUTPATIENT
Start: 2019-10-21 | End: 2019-10-21

## 2019-10-21 RX ADMIN — BUPIVACAINE HYDROCHLORIDE 2 ML: 2.5 INJECTION, SOLUTION INFILTRATION; PERINEURAL at 08:38

## 2019-10-21 RX ADMIN — BETAMETHASONE SODIUM PHOSPHATE AND BETAMETHASONE ACETATE 6 MG: 3; 3 INJECTION, SUSPENSION INTRA-ARTICULAR; INTRALESIONAL; INTRAMUSCULAR; SOFT TISSUE at 08:38

## 2019-10-21 NOTE — PROGRESS NOTES
Assessment  Diagnoses and all orders for this visit:    Subacromial impingement of right shoulder        Discussion and Plan:    · The patient has an examination consistent with subacromial impingement syndrome of the right shoulder  I have discussed with the patient the pathophysiology of this diagnosis and reviewed how the examination correlates with this diagnosis  Treatment options were discussed at length and after discussing these treatment options, the patient elected for and received a subacromial injection of corticosteroid (as described in the procedure note) with a prescription for referral to physical therapy  We will reevaluate the patient in 6-8 weeks  If the symptoms fail to improve with this treatment the patient would be indicated for further imaging in the form of an MRI scan of the shoulder  Subjective:   Patient ID: Abram Vale is a 62 y o  male      The patient presents with a chief complaint of right shoulder pain  The pain began several week(s) ago and is not associated with an acute injury  The patient describes the pain as aching and dull in intensity,  intermittent, occurring with increasing frequency in timing, and localizes the pain to the  right subacromial joint, glenohumeral joint  The pain is worse with overhead work, overuse and raising arm over head and relieved by rest, ice, avoiding the painful activities  The pain is not associated with numbness and tingling  The pain is not associated with constitutional symptoms  The patient is not awoken at night by the pain  The patient has had no prior treatment  Patient does have a history of a left shoulder arthroscopy with biceps tenodesis, SAD and debridement in 2017 performed by myself                The following portions of the patient's history were reviewed and updated as appropriate: allergies, current medications, past family history, past medical history, past social history, past surgical history and problem list     Review of Systems   Constitutional: Negative for chills, fatigue, fever and unexpected weight change  HENT: Negative for hearing loss, nosebleeds and sore throat  Eyes: Negative for pain, redness and visual disturbance  Respiratory: Negative for cough, shortness of breath and wheezing  Cardiovascular: Negative for chest pain, palpitations and leg swelling  Gastrointestinal: Negative for abdominal pain, nausea and vomiting  Endocrine: Negative for polydipsia and polyuria  Genitourinary: Negative for frequency and urgency  Skin: Negative for color change, rash and wound  Neurological: Negative for dizziness, weakness, numbness and headaches  Psychiatric/Behavioral: Negative for behavioral problems, self-injury and suicidal ideas  Objective:  /86   Pulse 70   Ht 5' 8" (1 727 m)   Wt 98 9 kg (218 lb)   BMI 33 15 kg/m²       Left Shoulder Exam     Tenderness   The patient is experiencing no tenderness  Range of Motion   The patient has normal left shoulder ROM  Muscle Strength   Abduction: 5/5   External rotation: 5/5     Tests   [place] test: positive  Impingement: positive    Other   Erythema: absent  Sensation: normal  Pulse: present               Physical Exam   Constitutional: He is oriented to person, place, and time  He appears well-developed and well-nourished  No distress  Eyes: Pupils are equal, round, and reactive to light  Conjunctivae are normal    Neck: Normal range of motion  Neck supple  Cardiovascular: Normal rate, regular rhythm and intact distal pulses  Pulmonary/Chest: Effort normal and breath sounds normal    Abdominal: Soft  Bowel sounds are normal    Neurological: He is alert and oriented to person, place, and time  He has normal reflexes  Skin: Skin is warm and dry  No rash noted  No erythema  Psychiatric: He has a normal mood and affect   His behavior is normal        Large joint arthrocentesis: R subacromial bursa  Date/Time: 10/21/2019 8:38 AM  Consent given by: patient  Site marked: site marked  Timeout: Immediately prior to procedure a time out was called to verify the correct patient, procedure, equipment, support staff and site/side marked as required   Supporting Documentation  Indications: pain and diagnostic evaluation   Procedure Details  Location: shoulder - R subacromial bursa  Preparation: Patient was prepped and draped in the usual sterile fashion  Needle size: 22 G  Ultrasound guidance: no  Approach: lateral  Medications administered: 2 mL bupivacaine 0 25 %; 6 mg betamethasone acetate-betamethasone sodium phosphate 6 (3-3) mg/mL    Patient tolerance: patient tolerated the procedure well with no immediate complications  Dressing:  Sterile dressing applied        I have personally reviewed pertinent films in PACS and my interpretation is as follows  X-ray right shoulder: No acute osseous abnormality or degenerative changes       Scribe Attestation    I,:   Caleb Buenrostro am acting as a scribe while in the presence of the attending physician :        I,:   Kay Holt MD personally performed the services described in this documentation    as scribed in my presence :

## 2019-10-21 NOTE — PATIENT INSTRUCTIONS

## 2019-10-22 ENCOUNTER — EVALUATION (OUTPATIENT)
Dept: PHYSICAL THERAPY | Facility: CLINIC | Age: 64
End: 2019-10-22
Payer: COMMERCIAL

## 2019-10-22 DIAGNOSIS — M75.41 SUBACROMIAL IMPINGEMENT OF RIGHT SHOULDER: ICD-10-CM

## 2019-10-22 PROCEDURE — 97161 PT EVAL LOW COMPLEX 20 MIN: CPT | Performed by: PHYSICAL THERAPIST

## 2019-10-22 PROCEDURE — 97140 MANUAL THERAPY 1/> REGIONS: CPT | Performed by: PHYSICAL THERAPIST

## 2019-10-22 PROCEDURE — 97535 SELF CARE MNGMENT TRAINING: CPT | Performed by: PHYSICAL THERAPIST

## 2019-10-22 PROCEDURE — 97110 THERAPEUTIC EXERCISES: CPT | Performed by: PHYSICAL THERAPIST

## 2019-10-22 NOTE — PROGRESS NOTES
PT Evaluation     Today's date: 10/22/2019  Patient name: Marleni Pang  : 1955  MRN: [id number]  Referring provider: Alvaro Collins MD  Dx:   Encounter Diagnosis     ICD-10-CM    1  Subacromial impingement of right shoulder M75 41 Ambulatory referral to Physical Therapy       Start Time:   Stop Time: 144  Total time in clinic (min): 40 minutes    Assessment  Assessment details: Marleni Pang is a pleasant 62 y o  male who presents with R shoulder pain  The patient's greatest concerns are the pain he is experiencing, worry over not knowing what's wrong, wanting to avoid surgery and fear of not being able to keep active  No further referral appears necessary at this time based upon examination results  Primary movement impairment diagnosis of subacromial pain resulting in pathoanatomical symptoms of rotator cuff impingement and limiting his ability to care for self, exercise or recreation, lift, perform household chores, perform yard work, reach overhead and sleep  Primary Impairments:  1) Poor scapular neuromuscular control  2) Glenohumeral hypomobility in R shoulder    Etiologic factors include none recalled by the patient  Impairments: abnormal or restricted ROM, abnormal movement, activity intolerance, pain with function, scapular dyskinesis and poor body mechanics    Symptom irritability: moderateUnderstanding of Dx/Px/POC: good   Prognosis: good  Prognosis details: Positive prognostic indicators include positive attitude toward recovery, good understanding of diagnosis and treatment plan options, absence of peripheralization and absence of observed red flags  Negative prognostic indicators include chronicity of symptoms and high symptom irritability  Goals  Patient will be independent with home exercise program    Patient will be able to manage symptoms independently  Short Term Goals: to be achieved by 4 weeks  1) Patient to be independent with basic HEP    2) Decrease pain to 3/10 at its worst   3) Increase shoulder ROM by 5-10 degrees in all planes  4) Increase shoulder strength by 1/2 MMT grade in all deficient planes  Long Term Goals: to be achieved by discharge  1) FOTO equal to or greater than 68   2) Patient to be independent with comprehensive HEP  3) Patient will demonstrate maximal over head reaching  4) Increase UE strength to 5/5 MMT grade in all planes to improve a/iadls  5) Patient to report no sleep interruption secondary to pain  Plan  Patient would benefit from: skilled physical therapy  Planned modality interventions: Modalities PRN  Planned therapy interventions: activity modification, manual therapy, neuromuscular re-education, patient education, therapeutic activities, therapeutic exercise, graded activity, home exercise program, behavior modification and self care  Frequency: 2x week  Duration in weeks: 6  Treatment plan discussed with: patient        Subjective Evaluation    History of Present Illness  Mechanism of injury: History of Current Injury: The patient reports R shoulder pain over the past several years, reports recent increase in symptom which is prohibiting sleep and limiting his reaching   The patient reports that he recently received steroid injection into the R shoulder with some relief   Surgery date: N/A  Pain location/Descriptors: Lateral shoulder pain with elevation  Aggravating factors: elevating arm, lifting, sleep  Easing factors: Pain medication, exercise  24 HR pattern: Increased pain at night  Imaging: XR of R shoulder  Special Questions: Some sleep disturbance  Patient goals:  Return to daily activities without pain, no trouble sleeping  Occupation: Desk work, carpentry    Quality of life: good    Pain  At worst pain ratin  Quality: sharp, radiating, throbbing and discomfort  Relieving factors: ice, heat, rest, support, change in position and medications  Aggravating factors: overhead activity, keyboarding and lifting    Social Support  Lives in: multiple-level home  Lives with: alone    Employment status: working  Hand dominance: right      Diagnostic Tests  X-ray: normal  Treatments  Previous treatment: injection treatment  Patient Goals  Patient goals for therapy: decreased pain, increased motion, independence with ADLs/IADLs, increased strength and return to sport/leisure activities          Objective     Neurological Testing     Sensation     Shoulder   Left Shoulder   Intact: light touch    Right Shoulder   Intact: light touch    Reflexes   Left   Biceps (C5/C6): normal (2+)  Brachioradialis (C6): normal (2+)  Triceps (C7): normal (2+)  Hernández's reflex: negative    Right   Biceps (C5/C6): normal (2+)  Brachioradialis (C6): normal (2+)  Triceps (C7): normal (2+)  Hernández's reflex: negative    Active Range of Motion   Left Shoulder   Flexion: WFL  Abduction: WFL  External rotation BTH: T3   Internal rotation BTB: T8     Right Shoulder   Flexion: 160 degrees   Abduction: 140 degrees   External rotation BTH: T2   Internal rotation BTB: T9     Joint Play     Right Shoulder  Hypomobile in the posterior capsule, inferior capsule and thoracic spine  Strength/Myotome Testing     Left Shoulder   Normal muscle strength    Right Shoulder     Planes of Motion   Flexion: 4-   Abduction: 4-   External rotation at 0°: 4-   Internal rotation at 0°: 4-     Isolated Muscles   Biceps: 4-   Infraspinatus: 4-   Serratus anterior: 3-   Subscapularis: 4-   Supraspinatus: 4-   Teres minor: 4-   Triceps: 4-     Tests     Right Shoulder   Positive Neer's, painful arc, scapular relocation and scapular assistance   Negative apprehension, belly press, drop arm, external rotation lag sign, Hawkin's, horn blower, internal rotation lag sign and upper cut                 Precautions: Obesity      Manual  10/22            PROM  5 min            Joint mobs post/inf (gr II-III) 5 min            T-spine P-A mob Exercise Diary  10/22            Serratus press at wall x20            No Money x20            Standing shoulder flex @ mirror x20                                                                                                                                                                                                               Assessment IE            Education HEP, POC                Modalities

## 2019-10-28 NOTE — PROGRESS NOTES
Daily Note     Today's date: 10/29/2019  Patient name: Julissa Best  : 1955  MRN: [id number]  Referring provider: Rea Sanchez MD  Dx:   Encounter Diagnosis     ICD-10-CM    1  Subacromial impingement of right shoulder M75 41        Start Time: 3955  Stop Time: 1510  Total time in clinic (min): 45 minutes    Subjective: The patient presents for the first follow-up appointment, reports that symptoms improved and then worsened and that he was compliant all of the time with the initial HEP        Objective: See treatment diary below      Assessment: The patient tolerated manual and active treatment well today  The PT reviewed IHEP and initiated introductory manual and therex program during today's treatment  The patient demonstrated good response to today's treatment  Plan: Continue per plan of care        Precautions: Obesity      Manual  10/22 10/29           PROM  5 min 5 min           Joint mobs post/inf (gr II-III) 5 min 8 min           T-spine P-A mob                                           Exercise Diary  10/22 10/29           Pulleys (flex/abd)  x4 min ea           Serratus press at wall x20 Supine serratus press w/ cane           No Money x20            Scap retract  x25           Standing shoulder flex @ mirror x20            Supine cane ER  x25           Pulldowns @ Maria T  x25           ER/IR at [employer]  GTB                                                                                                                                                                       Assessment IE            Education HEP, POC                Modalities

## 2019-10-29 ENCOUNTER — OFFICE VISIT (OUTPATIENT)
Dept: PHYSICAL THERAPY | Facility: CLINIC | Age: 64
End: 2019-10-29
Payer: COMMERCIAL

## 2019-10-29 DIAGNOSIS — M75.41 SUBACROMIAL IMPINGEMENT OF RIGHT SHOULDER: Primary | ICD-10-CM

## 2019-10-29 PROCEDURE — 97110 THERAPEUTIC EXERCISES: CPT | Performed by: PHYSICAL THERAPIST

## 2019-10-29 PROCEDURE — 97140 MANUAL THERAPY 1/> REGIONS: CPT | Performed by: PHYSICAL THERAPIST

## 2019-11-05 ENCOUNTER — OFFICE VISIT (OUTPATIENT)
Dept: PHYSICAL THERAPY | Facility: CLINIC | Age: 64
End: 2019-11-05
Payer: COMMERCIAL

## 2019-11-05 DIAGNOSIS — M75.41 SUBACROMIAL IMPINGEMENT OF RIGHT SHOULDER: Primary | ICD-10-CM

## 2019-11-05 PROCEDURE — 97110 THERAPEUTIC EXERCISES: CPT

## 2019-11-05 PROCEDURE — 97140 MANUAL THERAPY 1/> REGIONS: CPT

## 2019-11-05 NOTE — PROGRESS NOTES
Daily Note     Today's date: 2019  Patient name: Ambar Mace  : 1955  MRN: [id number]  Referring provider: Ana Brito MD  Dx: No diagnosis found  Subjective: Patient states he is been feeling better and noticing improvements  Has most pain into ER  Objective: See treatment diary below      Assessment: Patient tolerated treatment well today  Progressed patient with reps today with good tolerance and minimal fatigue  Patient did require occasional postural cues for correct technique  Patient noted his ROM has been improving significantly  Plan: Continue per plan of care        Precautions: Obesity      Manual  10/22 10/29 11/5          PROM  5 min 5 min 10 min          Joint mobs post/inf (gr II-III) 5 min 8 min np           T-spine P-A mob                                           Exercise Diary  10/22 10/29 11/5          Pulleys (flex/abd)  x4 min ea x4 min ea          Serratus press at wall x20 Supine serratus press w/ cane x25          No Money x20            Scap retract  x25 x30          Standing shoulder flex @ mirror x20            Supine cane ER  x25 x30          Pulldowns @ Maria T  x25 x30          ER/IR at [facility]  x30 ea  GTB x30  Ea GTB                                                                                                                                                                      Assessment IE            Education HEP, POC                Modalities

## 2019-11-11 NOTE — PROGRESS NOTES
Daily Note     Today's date: 2019  Patient name: Randa Clemente  : 1955  MRN: [id number]  Referring provider: Christine Wyman MD  Dx:   Encounter Diagnosis     ICD-10-CM    1  Subacromial impingement of right shoulder M75 41        Start Time:   Stop Time: 144  Total time in clinic (min): 45 minutes    Subjective: No new complaints today  The patient reports some change in symptoms since previous session  Objective: See treatment diary below      Assessment: The patient tolerated manual and active treatment well today  The PT continued current emphasis on symptom control through manual intervention and protective strengthening to promote improved overall function during today's treatment  The patient demonstrated good response to today's treatment  Plan: Continue per plan of care        Precautions: Obesity      Manual  10/22 10/29 11/5 11/12         PROM  5 min 5 min 10 min 10         Joint mobs post/inf (gr II-III) 5 min 8 min np  7 min          T-spine P-A mob             STM    7 min                          Exercise Diary  10/22 10/29 11/5 11/12         Pulleys (flex/abd)  x4 min ea x4 min ea 4 min ea         Serratus press at wall x20 Supine serratus press w/ cane x25          No Money x20            Scap retract  x25 x30 x30         Standing shoulder flex @ mirror x20            Supine cane ER  x25 x30          Pulldowns @ [place]  x25 x30          ER/IR at [facility]  x30 ea  GTB x30  Ea GTB          Supine post capsule stretch   10 sec x10          Behind-the-back stretch   10 sec x10          Doorway stretch   10 sec x10                                                                                                                               Assessment IE            Education HEP, POC  UHEP              Modalities

## 2019-11-12 ENCOUNTER — OFFICE VISIT (OUTPATIENT)
Dept: PHYSICAL THERAPY | Facility: CLINIC | Age: 64
End: 2019-11-12
Payer: COMMERCIAL

## 2019-11-12 DIAGNOSIS — M75.41 SUBACROMIAL IMPINGEMENT OF RIGHT SHOULDER: Primary | ICD-10-CM

## 2019-11-12 PROCEDURE — 97110 THERAPEUTIC EXERCISES: CPT | Performed by: PHYSICAL THERAPIST

## 2019-11-12 PROCEDURE — 97140 MANUAL THERAPY 1/> REGIONS: CPT | Performed by: PHYSICAL THERAPIST

## 2019-11-19 ENCOUNTER — OFFICE VISIT (OUTPATIENT)
Dept: PHYSICAL THERAPY | Facility: CLINIC | Age: 64
End: 2019-11-19
Payer: COMMERCIAL

## 2019-11-19 DIAGNOSIS — M75.41 SUBACROMIAL IMPINGEMENT OF RIGHT SHOULDER: Primary | ICD-10-CM

## 2019-11-19 PROCEDURE — 97110 THERAPEUTIC EXERCISES: CPT | Performed by: PHYSICAL THERAPIST

## 2019-11-19 PROCEDURE — 97140 MANUAL THERAPY 1/> REGIONS: CPT | Performed by: PHYSICAL THERAPIST

## 2019-11-19 NOTE — PROGRESS NOTES
Daily Note     Today's date: 2019  Patient name: Griffin Arnold  : 1955  MRN: [id number]  Referring provider: Marie Nesbitt MD  Dx:   Encounter Diagnosis     ICD-10-CM    1  Subacromial impingement of right shoulder M75 41        Start Time: 43  Stop Time: 1450  Total time in clinic (min): 45 minutes    Subjective: No new complaints today  The patient reports some change in symptoms since previous session, but continues to report pain with sleeping  Objective: See treatment diary below      Assessment: The patient tolerated manual and active treatment well today  The PT reviewed the stretching program that was provided for home use on the last visit, and continued current emphasis on symptom control through manual intervention and protective strengthening to promote improved overall function during today's treatment  The patient demonstrated good response to today's treatment  Plan: Continue per plan of care        Precautions: Obesity      Manual  10/22 10/29 11/5 11/12 11/19        PROM  5 min 5 min 10 min 10 10 min        Joint mobs post/inf (gr II-III) 5 min 8 min np  7 min  7 min        T-spine P-A mob             STM    7 min 7 min                         Exercise Diary  10/22 10/29 11/5 11/12 11/19        Pulleys (flex/abd)  x4 min ea x4 min ea 4 min ea 3 min ea        Serratus press at wall x20 Supine serratus press w/ cane x25          No Money x20            Scap retract  x25 x30 x30         Standing shoulder flex @ mirror x20            Supine cane ER  x25 x30          Pulldowns @ [facility]  x25 x30          ER/IR at [facility]  x30 ea  GTB x30  Ea GTB          Supine post capsule stretch   10 sec x10  10 sec x10        Behind-the-back stretch   10 sec x10          Doorway stretch   10 sec x10          Supine elbow butterfly ER stretch     10 sec x10        Sidelying sleeper stretch     10 sec x10        Prone row     2x20        Prone T     2x20 Assessment IE            Education HEP, POC  UHEP  UHEP            Modalities

## 2019-11-25 NOTE — PROGRESS NOTES
Daily Note     Today's date: 2019  Patient name: Mackenzie Reese  : 1955  MRN: [id number]  Referring provider: Felix Bragg MD  Dx:   Encounter Diagnosis     ICD-10-CM    1  Subacromial impingement of right shoulder M75 41        Start Time:   Stop Time: 144  Total time in clinic (min): 45 minutes    Subjective: No new complaints today  The patient reports improvement in symptoms since previous session  Objective: See treatment diary below      Assessment: The patient tolerated manual and active treatment well today  The PT reincorporated introductory rotator cuff strengthening into current program to promote continued healing of involved tissues during today's treatment  The patient demonstrated good response to today's treatment  Plan: Continue per plan of care        Precautions: Obesity      Manual  10/22 10/29 11/5 11/12 11/19 11/26       PROM  5 min 5 min 10 min 10 10 min        Joint mobs post/inf (gr II-III) 5 min 8 min np  7 min  7 min        T-spine P-A mob             STM    7 min 7 min                         Exercise Diary  10/22 10/29 11/5 11/12 11/19 11/26       Pulleys (flex/abd)  x4 min ea x4 min ea 4 min ea 3 min ea        Serratus press at wall x20 Supine serratus press w/ cane x25          No Money x20            Scap retract  x25 x30 x30         Standing shoulder flex @ mirror x20            Supine cane ER  x25 x30          Pulldowns @ [facility]  x25 x30          ER/IR at [facility]  x30 ea  GTB x30  Ea GTB          Supine post capsule stretch   10 sec x10  10 sec x10        Behind-the-back stretch   10 sec x10          Doorway stretch   10 sec x10          Supine elbow butterfly ER stretch     10 sec x10        Sidelying sleeper stretch     10 sec x10        Prone row     2x20 2x20       Prone Y/T/I     2x20 2x20 ea       Scapular row      3x10 ex BTB       Shoulder extensions      3x10 ex BTB                                              Assessment IE     FOTO Education HEP, POC  [facility]            Modalities

## 2019-11-26 ENCOUNTER — OFFICE VISIT (OUTPATIENT)
Dept: PHYSICAL THERAPY | Facility: CLINIC | Age: 64
End: 2019-11-26
Payer: COMMERCIAL

## 2019-11-26 DIAGNOSIS — M75.41 SUBACROMIAL IMPINGEMENT OF RIGHT SHOULDER: Primary | ICD-10-CM

## 2019-11-26 PROCEDURE — 97110 THERAPEUTIC EXERCISES: CPT | Performed by: PHYSICAL THERAPIST

## 2019-11-26 PROCEDURE — 97112 NEUROMUSCULAR REEDUCATION: CPT | Performed by: PHYSICAL THERAPIST

## 2019-11-26 PROCEDURE — 97140 MANUAL THERAPY 1/> REGIONS: CPT | Performed by: PHYSICAL THERAPIST

## 2019-12-02 ENCOUNTER — OFFICE VISIT (OUTPATIENT)
Dept: OBGYN CLINIC | Facility: HOSPITAL | Age: 64
End: 2019-12-02
Payer: COMMERCIAL

## 2019-12-02 VITALS
SYSTOLIC BLOOD PRESSURE: 126 MMHG | WEIGHT: 215 LBS | HEART RATE: 71 BPM | BODY MASS INDEX: 32.58 KG/M2 | DIASTOLIC BLOOD PRESSURE: 85 MMHG | HEIGHT: 68 IN

## 2019-12-02 DIAGNOSIS — M75.81 TENDINITIS OF RIGHT ROTATOR CUFF: ICD-10-CM

## 2019-12-02 DIAGNOSIS — M75.41 SUBACROMIAL IMPINGEMENT OF RIGHT SHOULDER: Primary | ICD-10-CM

## 2019-12-02 PROCEDURE — 99213 OFFICE O/P EST LOW 20 MIN: CPT | Performed by: ORTHOPAEDIC SURGERY

## 2019-12-02 NOTE — PROGRESS NOTES
Assessment  Diagnoses and all orders for this visit:    Subacromial impingement of right shoulder    Tendinitis of right rotator cuff        Discussion and Plan:  Patient would like to continue conservative treatment at this time as he does feel he is improving with PT and his HEP  Should his symptoms fail to improve or worsen he will call the office and we can order a MRI  Subjective:   Patient ID: Sue Graves is a 62 y o  male      HPI  Patient presents today for follow up of right shoulder impingement syndrome  He received an injection at his last visit on 10/21/19 and was referred to physical therapy  He has been attending PT as instructed and has been doing his home exercise program    Patient states overall he is improving  Pain no longer wakes him from sleep  Patient feels his pain is tolerable  The following portions of the patient's history were reviewed and updated as appropriate: allergies, current medications, past family history, past medical history, past social history, past surgical history and problem list     Review of Systems   Constitutional: Negative for chills and fever  HENT: Negative for drooling and hearing loss  Eyes: Negative for visual disturbance  Respiratory: Negative for cough and shortness of breath  Cardiovascular: Negative for chest pain  Gastrointestinal: Negative for abdominal pain  Skin: Negative for rash  Psychiatric/Behavioral: Negative for agitation  Objective:  /85   Pulse 71   Ht 5' 8" (1 727 m)   Wt 97 5 kg (215 lb)   BMI 32 69 kg/m²       Right Shoulder Exam     Tenderness   The patient is experiencing no tenderness  Range of Motion   The patient has normal right shoulder ROM      Muscle Strength   Abduction: 5/5   Internal rotation: 5/5   External rotation: 5/5     Tests   Gamboa test: positive  Impingement: positive    Other   Erythema: absent  Sensation: normal  Pulse: present          Physical Exam Constitutional: He appears well-developed and well-nourished  HENT:   Head: Normocephalic  Eyes: Pupils are equal, round, and reactive to light  Pulmonary/Chest: Effort normal    Skin: Skin is warm and dry  Psychiatric: He has a normal mood and affect  Vitals reviewed  I have personally reviewed pertinent films in PACS and my interpretation is as follows    X-ray right shoulder: No acute osseous abnormality or degenerative changes       Scribe Attestation    I,:   Saloni Crespo am acting as a scribe while in the presence of the attending physician :        I,:   Dora Cannon MD personally performed the services described in this documentation    as scribed in my presence :

## 2019-12-03 ENCOUNTER — EVALUATION (OUTPATIENT)
Dept: PHYSICAL THERAPY | Facility: CLINIC | Age: 64
End: 2019-12-03
Payer: COMMERCIAL

## 2019-12-03 DIAGNOSIS — M75.41 SUBACROMIAL IMPINGEMENT OF RIGHT SHOULDER: Primary | ICD-10-CM

## 2019-12-03 PROCEDURE — 97164 PT RE-EVAL EST PLAN CARE: CPT | Performed by: PHYSICAL THERAPIST

## 2019-12-03 PROCEDURE — 97110 THERAPEUTIC EXERCISES: CPT | Performed by: PHYSICAL THERAPIST

## 2019-12-03 PROCEDURE — 97535 SELF CARE MNGMENT TRAINING: CPT | Performed by: PHYSICAL THERAPIST

## 2019-12-03 PROCEDURE — 97140 MANUAL THERAPY 1/> REGIONS: CPT | Performed by: PHYSICAL THERAPIST

## 2019-12-03 NOTE — PROGRESS NOTES
PT Re-Evaluation     Today's date: 12/3/2019  Patient name: Miriam Hassan  : 1955  MRN: [id number]  Referring provider: Nan Figueroa MD  Dx:   Encounter Diagnosis     ICD-10-CM    1  Subacromial impingement of right shoulder M75 41        Start Time: 1400  Stop Time: 1445  Total time in clinic (min): 45 minutes    Subjective: No new complaints today  The patient reports improvement in symptoms since previous session  The patient reports 1/10 pain at it's worst over the past 24 hours, and reports 85% improvement in overall condition since beginning formal PT care  Objective: See treatment diary below      Active Range of Motion   Left Shoulder   Flexion: WFL  Abduction: WFL  External rotation BTH: T3   Internal rotation BTB: T8     Right Shoulder   Flexion: 170 degrees   Abduction: 162 degrees   External rotation BTH: T5  Internal rotation BTB: T8     Joint Play     Right Shoulder  Hypomobile in the posterior capsule, inferior capsule and thoracic spine  Strength/Myotome Testing     Left Shoulder   Normal muscle strength    Right Shoulder     Planes of Motion   Flexion: 5   Abduction: 5  External rotation at 0°: 5  Internal rotation at 0°: 5    Isolated Muscles   Biceps: 5  Infraspinatus: 5  Serratus anterior: 5   Subscapularis: 5  Supraspinatus: 5  Teres minor: 5  Triceps: 5    Tests     Right Shoulder   Negative Neer's, painful arc, scapular relocation and scapular assistance   Negative apprehension, belly press, drop arm, external rotation lag sign, Hawkin's, horn blower, internal rotation lag sign and upper cut   Assessment: Miriam Hassan is a pleasant 62 y o  male who has been receiving PT intervention for shoulder pain  The patient has demonstrated decreased pain, increased strength, increased ROM, increased joint mobility and increased activity tolerance since beginning treatment   Current chief complaint is pain in the posterolateral shoulder with occasional motion  Primary Remaining Impairments:  1)  Reduced ER/IR P/AROM      Goals  Patient will be independent with home exercise program    Patient will be able to manage symptoms independently       Short Term Goals: to be achieved by 4 weeks  1) Patient to be independent with basic HEP (MET)  2) Decrease pain to 3/10 at its worst (MET)  3) Increase shoulder ROM by 5-10 degrees in all planes (MET)  4) Increase shoulder strength by 1/2 MMT grade in all deficient planes (MET)    Long Term Goals: to be achieved by discharge  1) FOTO equal to or greater than 68 (MET)  2) Patient to be independent with comprehensive HEP (MET)  3) Patient will demonstrate maximal over head reaching (MET)  4) Increase UE strength to 5/5 MMT grade in all planes to improve a/iadls (MET)  5) Patient to report no sleep interruption secondary to pain (MET)      Plan: The patient has met or exceeded his short & long term goals and is a candidate for discharge from formal PT care to an independent home program     Precautions: Obesity      Manual  10/22 10/29 11/5 11/12 11/19 11/26 12/3      PROM  5 min 5 min 10 min 10 10 min        Joint mobs post/inf (gr II-III) 5 min 8 min np  7 min  7 min        T-spine P-A mob             STM    7 min 7 min                         Exercise Diary  10/22 10/29 11/5 11/12 11/19 11/26 12/3      Pulleys (flex/abd)  x4 min ea x4 min ea 4 min ea 3 min ea        Serratus press at wall x20 Supine serratus press w/ cane x25          No Money x20            Scap retract  x25 x30 x30         Standing shoulder flex @ mirror x20            Supine cane ER  x25 x30          Pulldowns @ [facility]  x25 x30          ER/IR at [facility]  x30 ea  GTB x30  Ea GTB          Supine post capsule stretch   10 sec x10  10 sec x10        Behind-the-back stretch   10 sec x10          Doorway stretch   10 sec x10          Supine elbow butterfly ER stretch     10 sec x10        Sidelying sleeper stretch     10 sec x10        Prone row     2x20 2x20       Prone Y/T/I     2x20 2x20 ea       Scapular row      3x10 ex BTB       Shoulder extensions      3x10 ex BTB                                              Assessment IE     FOTO RE, FOTO      Education HEP, POC  UHEP  UHEP            Modalities

## 2019-12-10 ENCOUNTER — APPOINTMENT (OUTPATIENT)
Dept: PHYSICAL THERAPY | Facility: CLINIC | Age: 64
End: 2019-12-10
Payer: COMMERCIAL

## 2020-01-14 ENCOUNTER — OFFICE VISIT (OUTPATIENT)
Dept: OBGYN CLINIC | Facility: OTHER | Age: 65
End: 2020-01-14
Payer: COMMERCIAL

## 2020-01-14 VITALS
BODY MASS INDEX: 33.04 KG/M2 | HEART RATE: 77 BPM | SYSTOLIC BLOOD PRESSURE: 110 MMHG | DIASTOLIC BLOOD PRESSURE: 72 MMHG | WEIGHT: 218 LBS | HEIGHT: 68 IN

## 2020-01-14 DIAGNOSIS — M75.41 SUBACROMIAL IMPINGEMENT OF RIGHT SHOULDER: Primary | ICD-10-CM

## 2020-01-14 PROCEDURE — 99214 OFFICE O/P EST MOD 30 MIN: CPT | Performed by: ORTHOPAEDIC SURGERY

## 2020-01-14 RX ORDER — LORAZEPAM 1 MG/1
1 TABLET ORAL
Qty: 1 TABLET | Refills: 0 | Status: SHIPPED | OUTPATIENT
Start: 2020-01-14 | End: 2021-01-27 | Stop reason: ALTCHOICE

## 2020-01-14 NOTE — PROGRESS NOTES
Rafael Oliveira MD personally examined the patient and reviewed the history provided  I agree with the note and the assessment and plan by David Hancock PA-C  Patient continues to be symptomatic with his right shoulder despite appropriate nonoperative care so therefore MRI is indicated as detailed in the note below  Happy to review the results with him and discuss further options when it has been obtained            Assessment  Diagnoses and all orders for this visit:    Subacromial impingement of right shoulder  -     MRI shoulder right wo contrast; Future  -     LORazepam (ATIVAN) 1 mg tablet; Take 1 tablet (1 mg total) by mouth once in imaging for anxiety for up to 1 dose Prior to MRI        Discussion and Plan:    1  MRI of right shoulder  2  Follow up after MR to review results  3  Patient has history of claustrophobia, so ativan will be order for the imaging  If he still cannot tolerate MRI, then CT arthrogram will be the next best study  4  Tylenol PRN  5  Continue with home exercise program until follow up     Subjective:   Patient ID: Srinath Turner is a 62 y o  male      [place] returns in follow up of the right shoulder  He has transitioned out of formal therapy but continues with a home exercise program   He denies new injury or trauma  Shoulder pain continues to bother him and limit his ability to perform ADLs and activities of enjoyment  He denies significant pain at night  He is able to lift but nothing heavy  He has pain with certain reaching and lifting motions  He denies numbness or tingling  He denies improvement with OTC medication    The following portions of the patient's history were reviewed and updated as appropriate: allergies, current medications, past family history, past medical history, past social history, past surgical history and problem list     Review of Systems   Constitutional: Negative for chills and fever  HENT: Negative for hearing loss      Eyes: Negative for visual disturbance  Respiratory: Negative for shortness of breath  Cardiovascular: Negative for chest pain  Gastrointestinal: Negative for abdominal pain  Musculoskeletal:        As reviewed in the HPI   Skin: Negative for rash  Neurological:        As reviewed in the HPI   Psychiatric/Behavioral: Negative for agitation  Objective:  /72   Pulse 77   Ht 5' 8" (1 727 m)   Wt 98 9 kg (218 lb)   BMI 33 15 kg/m²       Right Shoulder Exam     Tenderness   The patient is experiencing no tenderness  Range of Motion   The patient has normal right shoulder ROM  Muscle Strength   External rotation: 3/5   Supraspinatus: 4/5     Tests   Apprehension: positive  Gamboa test: positive  Impingement: positive  Drop arm: negative    Other   Erythema: absent  Sensation: normal  Pulse: present    Comments:  +speeds            Physical Exam   Constitutional: He is oriented to person, place, and time  He appears well-developed and well-nourished  HENT:   Head: Normocephalic  Eyes: EOM are normal    Neck: Normal range of motion  Pulmonary/Chest: No respiratory distress  He has no wheezes  Neurological: He is alert and oriented to person, place, and time  Skin: Skin is warm and dry  Psychiatric: He has a normal mood and affect   His behavior is normal  Judgment and thought content normal

## 2020-01-29 ENCOUNTER — HOSPITAL ENCOUNTER (OUTPATIENT)
Dept: MRI IMAGING | Facility: HOSPITAL | Age: 65
Discharge: HOME | End: 2020-01-29
Payer: COMMERCIAL

## 2020-01-29 DIAGNOSIS — M75.41 SUBACROMIAL IMPINGEMENT OF RIGHT SHOULDER: ICD-10-CM

## 2020-01-29 PROCEDURE — 73221 MRI JOINT UPR EXTREM W/O DYE: CPT

## 2020-02-13 ENCOUNTER — OFFICE VISIT (OUTPATIENT)
Dept: OBGYN CLINIC | Facility: OTHER | Age: 65
End: 2020-02-13
Payer: COMMERCIAL

## 2020-02-13 VITALS
WEIGHT: 216 LBS | HEART RATE: 71 BPM | HEIGHT: 68 IN | SYSTOLIC BLOOD PRESSURE: 131 MMHG | DIASTOLIC BLOOD PRESSURE: 81 MMHG | BODY MASS INDEX: 32.74 KG/M2

## 2020-02-13 DIAGNOSIS — M75.41 SUBACROMIAL IMPINGEMENT OF RIGHT SHOULDER: ICD-10-CM

## 2020-02-13 DIAGNOSIS — M19.011 OSTEOARTHRITIS OF RIGHT GLENOHUMERAL JOINT: Primary | ICD-10-CM

## 2020-02-13 DIAGNOSIS — M75.20 TENDINITIS OF LONG HEAD OF BICEPS: ICD-10-CM

## 2020-02-13 PROCEDURE — 20610 DRAIN/INJ JOINT/BURSA W/O US: CPT | Performed by: ORTHOPAEDIC SURGERY

## 2020-02-13 PROCEDURE — 3008F BODY MASS INDEX DOCD: CPT | Performed by: ORTHOPAEDIC SURGERY

## 2020-02-13 PROCEDURE — 1036F TOBACCO NON-USER: CPT | Performed by: ORTHOPAEDIC SURGERY

## 2020-02-13 PROCEDURE — 99214 OFFICE O/P EST MOD 30 MIN: CPT | Performed by: ORTHOPAEDIC SURGERY

## 2020-02-13 RX ORDER — BETAMETHASONE SODIUM PHOSPHATE AND BETAMETHASONE ACETATE 3; 3 MG/ML; MG/ML
6 INJECTION, SUSPENSION INTRA-ARTICULAR; INTRALESIONAL; INTRAMUSCULAR; SOFT TISSUE
Status: COMPLETED | OUTPATIENT
Start: 2020-02-13 | End: 2020-02-13

## 2020-02-13 RX ORDER — BUPIVACAINE HYDROCHLORIDE 2.5 MG/ML
2 INJECTION, SOLUTION INFILTRATION; PERINEURAL
Status: COMPLETED | OUTPATIENT
Start: 2020-02-13 | End: 2020-02-13

## 2020-02-13 RX ADMIN — BETAMETHASONE SODIUM PHOSPHATE AND BETAMETHASONE ACETATE 6 MG: 3; 3 INJECTION, SUSPENSION INTRA-ARTICULAR; INTRALESIONAL; INTRAMUSCULAR; SOFT TISSUE at 09:17

## 2020-02-13 RX ADMIN — BUPIVACAINE HYDROCHLORIDE 2 ML: 2.5 INJECTION, SOLUTION INFILTRATION; PERINEURAL at 09:17

## 2020-02-13 NOTE — PROGRESS NOTES
Assessment  [id number]    Discussion and Plan:    ***    Subjective:   Patient ID: Rachael Gonzalez is a 62 y o  male      HPI        The following portions of the patient's history were reviewed and updated as appropriate: allergies, current medications, past family history, past medical history, past social history, past surgical history and problem list     Review of Systems    Objective:  /81   Pulse 71   Ht 5' 8" (1 727 m)   Wt 98 kg (216 lb)   BMI 32 84 kg/m²       Ortho Exam    Physical Exam      I have personally reviewed pertinent films in PACS and my interpretation is as follows      ***  Scribe Attestation    I,:   Mame Dougherty am acting as a scribe while in the presence of the attending physician :        I,:   Amanda Velasco MD personally performed the services described in this documentation    as scribed in my presence :

## 2020-02-13 NOTE — PROGRESS NOTES
Edward Griffin MD personally examined the patient and reviewed the history provided  I agree with the note and the assessment and plan by Warren Wagner PA-C  Assessment  Diagnoses and all orders for this visit:    Subacromial impingement of right shoulder    Osteoarthritis of right glenohumeral joint    Tendinitis of long head of biceps    Other orders  -     Large joint arthrocentesis        Discussion and Plan:    Treatment options were discussed in detail  Options include arthroscopic evaluation of the right shoulder with likely bicep tenodesis and debridement of degenerative fraying of labrum  Patient states he would like to entertain surgical options in the spring as he cannot do the recovery required at this time  He elects to try another injection but in a different location (intraarticular) today  He will continue with home exercises to tolerance  Activities as tolerated  OTC medication as needed  Follow up in 2 months to further discuss surgical options pending response to steroid injection  Subjective:   Patient ID: Samantha Chaves is a 62 y o  male      [place] returns to the office in follow up of the right shoulder  He state the shoulder remains the same  He describes 'good days and bad days'  The shoulder is sore with motion and after activities  Pain improves with rest and activity modification  Pain occurs at night  He takes OTC medication if needed with some relief  He denies new injury or trauma  He presents today with a MRI for review  He was able to tolerate the MRI with Ativan  Denies numbness and tingling, but has been experiencing severe right hand pain  The right wrist has been sore for motions, but several days he had difficulty writing due to pain in the whole hand    Denies neck pain      The following portions of the patient's history were reviewed and updated as appropriate: allergies, current medications, past family history, past medical history, past social history, past surgical history and problem list     Review of Systems   Constitutional: Negative for chills and fever  HENT: Negative for hearing loss  Eyes: Negative for visual disturbance  Respiratory: Negative for shortness of breath  Cardiovascular: Negative for chest pain  Gastrointestinal: Negative for abdominal pain  Musculoskeletal:        As reviewed in the HPI   Skin: Negative for rash  Neurological:        As reviewed in the HPI   Psychiatric/Behavioral: Negative for agitation  Objective:  /81   Pulse 71   Ht 5' 8" (1 727 m)   Wt 98 kg (216 lb)   BMI 32 84 kg/m²       Right Shoulder Exam     Tenderness   The patient is experiencing no tenderness  Range of Motion   Passive abduction: 100   External rotation: 40 (equivalent to L)   Forward flexion: 170     Muscle Strength   External rotation: 3/5   Supraspinatus: 4/5     Tests   Gamboa test: positive  Cross arm: positive  Impingement: positive    Other   Erythema: absent  Sensation: normal  Pulse: present    Comments:  +speeds  +[place]riens          Physical Exam   Constitutional: He is oriented to person, place, and time  He appears well-developed and well-nourished  HENT:   Head: Normocephalic  Eyes: EOM are normal    Neck: Normal range of motion  Pulmonary/Chest: No respiratory distress  He has no wheezes  Neurological: He is alert and oriented to person, place, and time  Skin: Skin is warm and dry  Psychiatric: He has a normal mood and affect  His behavior is normal  Judgment and thought content normal      I have personally reviewed pertinent films in PACS and my interpretation is as follows      MRI right shoulder: long head bicep tendinosis, mild glenohumeral OA, degenerative tearing of the superior labrum, tendinosis of supraspinatus, infraspinatus and subscapularis without full thickness rotator cuff tear    Large joint arthrocentesis: R glenohumeral  Date/Time: 2/13/2020 9:17 AM  Consent given by: patient  Timeout: Immediately prior to procedure a time out was called to verify the correct patient, procedure, equipment, support staff and site/side marked as required   Supporting Documentation  Indications: pain   Procedure Details  Location: shoulder - R glenohumeral  Preparation: Patient was prepped and draped in the usual sterile fashion  Needle size: 22 G  Ultrasound guidance: no  Approach: posterior  Medications administered: 6 mg betamethasone acetate-betamethasone sodium phosphate 6 (3-3) mg/mL; 2 mL bupivacaine 0 25 %    Patient tolerance: patient tolerated the procedure well with no immediate complications  Dressing:  Sterile dressing applied

## 2020-02-13 NOTE — PATIENT INSTRUCTIONS

## 2020-09-09 ENCOUNTER — TELEPHONE (OUTPATIENT)
Dept: UROLOGY | Facility: CLINIC | Age: 65
End: 2020-09-09

## 2020-09-09 NOTE — TELEPHONE ENCOUNTER
Called and spoke to patient  Informed patient that PSA needs to be done PTV  Patient was unsure in insurance would cover if patient went to [facility] [place] [facility] lab  Informed patient I was unable to assist him, that he would have to call his insurance and they would be able to inform him on which labs are accepted  I did explain to patient if he needs it faxed to another lab we will be able to do so  Informed patient to call back if needed  Patient thankful for information and stated he will try and get it situated tomorrow 9/10/2020

## 2020-09-10 ENCOUNTER — APPOINTMENT (OUTPATIENT)
Dept: LAB | Facility: AMBULARY SURGERY CENTER | Age: 65
End: 2020-09-10
Payer: COMMERCIAL

## 2020-09-10 DIAGNOSIS — Z12.5 PROSTATE CANCER SCREENING: ICD-10-CM

## 2020-09-10 LAB — PSA SERPL-MCNC: 1.3 NG/ML (ref 0–4)

## 2020-09-10 PROCEDURE — 36415 COLL VENOUS BLD VENIPUNCTURE: CPT

## 2020-09-10 PROCEDURE — G0103 PSA SCREENING: HCPCS

## 2020-09-15 ENCOUNTER — OFFICE VISIT (OUTPATIENT)
Dept: UROLOGY | Facility: CLINIC | Age: 65
End: 2020-09-15
Payer: COMMERCIAL

## 2020-09-15 VITALS
DIASTOLIC BLOOD PRESSURE: 82 MMHG | HEIGHT: 68 IN | WEIGHT: 218.2 LBS | TEMPERATURE: 97.2 F | BODY MASS INDEX: 33.07 KG/M2 | SYSTOLIC BLOOD PRESSURE: 136 MMHG | HEART RATE: 79 BPM

## 2020-09-15 DIAGNOSIS — N30.01 ACUTE CYSTITIS WITH HEMATURIA: Primary | ICD-10-CM

## 2020-09-15 DIAGNOSIS — Z12.5 PROSTATE CANCER SCREENING: ICD-10-CM

## 2020-09-15 LAB
POST-VOID RESIDUAL VOLUME, ML POC: 111 ML
SL AMB  POCT GLUCOSE, UA: NORMAL
SL AMB LEUKOCYTE ESTERASE,UA: NORMAL
SL AMB POCT BILIRUBIN,UA: NORMAL
SL AMB POCT BLOOD,UA: NORMAL
SL AMB POCT CLARITY,UA: CLEAR
SL AMB POCT COLOR,UA: YELLOW
SL AMB POCT KETONES,UA: NORMAL
SL AMB POCT NITRITE,UA: NORMAL
SL AMB POCT PH,UA: 7.5
SL AMB POCT SPECIFIC GRAVITY,UA: 1
SL AMB POCT URINE PROTEIN: NORMAL
SL AMB POCT UROBILINOGEN: 0.2

## 2020-09-15 PROCEDURE — 99213 OFFICE O/P EST LOW 20 MIN: CPT | Performed by: PHYSICIAN ASSISTANT

## 2020-09-15 PROCEDURE — 81002 URINALYSIS NONAUTO W/O SCOPE: CPT | Performed by: PHYSICIAN ASSISTANT

## 2020-09-15 PROCEDURE — 1036F TOBACCO NON-USER: CPT | Performed by: PHYSICIAN ASSISTANT

## 2020-09-15 PROCEDURE — 51798 US URINE CAPACITY MEASURE: CPT | Performed by: PHYSICIAN ASSISTANT

## 2020-09-15 NOTE — PROGRESS NOTES
1  Acute cystitis with hematuria  POCT Measure PVR    POCT urine dip   2  Prostate cancer screening  PSA, Total Screen       Assessment and plan:       1  BPH with LUTS - managed by Dr Priscila Torres  - bladder wall thickening a noncontrast CT in 2018 with cystoscopy 2018 showing prostatomegaly and bladder trabeculations  - patient is demonstrating better bladder emptying in the office today    - comfortable with lower urinary tract symptoms at this time off of medical management  - encouraged to contact us should he have any worsening urination and wished to start tamsulosin  2  Routine prostate cancer screening  - PSA and prostate examination normal  - patient elects to continue routine prostate cancer screening with Urology  - follow-up 1 year PSA prior to visit for continued surveillance      Gideon Buck PA-C      Chief Complaint     F/u LUTS    History of Present Illness     Gricel Becker is a 62 y o  male patient of Dr Priscila Torres with a history of urinary tract infection with hematuria presenting for follow-up  Rani larson previously been seen for dysuria, frequency, urgency, and gross heamturia  Urine culture was positive for urinary infection  CT did reveal bladder wall thickening which could be correlated to be correlated with the concurrent urinary infection versus bladder outlet obstruction versus underlying bladder neoplasm  Patient underwent cystoscopy 09/14/2018 which was consistent with prostatomegaly and bladder outlet obstruction with moderate trabeculations  No evidence of any lower urinary tract lesions at that time  Patient has deferred any therapy for urination  Over the course of the past year patient remains comfortable with his urination  Denies any dysuria, gross hematuria, suprapubic pressure, urinary infections  Last PSA 1 3 (9/10/2020)    Urine dip in the office today is leukocyte, nitrite, and blood negative     mL    AUA SYMPTOM SCORE      Most Recent Value AUA SYMPTOM SCORE   How often have you had a sensation of not emptying your bladder completely after you finished urinating? 1   How often have you had to urinate again less than two hours after you finished urinating? 1   How often have you found you stopped and started again several times when you urinate? 1   How often have you found it difficult to postpone urination? 2   How often have you had a weak urinary stream?  1   How often have you had to push or strain to begin urination? 0   How many times did you most typically get up to urinate from the time you went to bed at night until the time you got up in the morning? 1   Quality of Life: If you were to spend the rest of your life with your urinary condition just the way it is now, how would you feel about that?  1   AUA SYMPTOM SCORE  7          Laboratory     Lab Results   Component Value Date    CREATININE 0 89 06/08/2018       Lab Results   Component Value Date    PSA 1 3 09/10/2020    PSA 1 2 09/10/2019    PSA 1 4 08/01/2018       Review of Systems     Review of Systems   Constitutional: Negative for activity change, appetite change, chills, diaphoresis, fatigue, fever and unexpected weight change  Respiratory: Negative for chest tightness and shortness of breath  Cardiovascular: Negative for chest pain, palpitations and leg swelling  Gastrointestinal: Negative for abdominal distention, abdominal pain, constipation, diarrhea, nausea and vomiting  Genitourinary: Positive for frequency  Negative for decreased urine volume, difficulty urinating, dysuria, enuresis, flank pain, genital sores, hematuria and urgency  Weak urinary stream, nocturia x1   Musculoskeletal: Negative for back pain, gait problem and myalgias  Skin: Negative for color change, pallor, rash and wound  Psychiatric/Behavioral: Negative for behavioral problems  The patient is not nervous/anxious          Allergies     Allergies   Allergen Reactions    Other Animal dander       Physical Exam     Physical Exam  Constitutional:       General: He is not in acute distress  Appearance: He is well-developed  He is not diaphoretic  HENT:      Head: Normocephalic and atraumatic  Eyes:      Conjunctiva/sclera: Conjunctivae normal    Neck:      Musculoskeletal: Normal range of motion  Trachea: No tracheal deviation  Pulmonary:      Effort: Pulmonary effort is normal    Genitourinary:     Comments: External nonthrombosed hemorrhoid  Prostate 35g, smooth, no nodules  Musculoskeletal: Normal range of motion  General: No deformity  Skin:     General: Skin is warm and dry  Findings: No erythema or rash  Neurological:      Mental Status: He is alert and oriented to person, place, and time     Psychiatric:         Behavior: Behavior normal            Vital Signs     Vitals:    09/15/20 1050   BP: 136/82   BP Location: Left arm   Patient Position: Sitting   Cuff Size: Standard   Pulse: 79   Temp: (!) 97 2 °F (36 2 °C)   Weight: 99 kg (218 lb 3 2 oz)   Height: 5' 8" (1 727 m)         Current Medications       Current Outpatient Medications:     folic acid (FOLVITE) 052 mcg tablet, Take 400 mcg by mouth daily, Disp: , Rfl:     mometasone (ASMANEX 120 METERED DOSES) 220 MCG/INH inhaler, Inhale, Disp: , Rfl:     Multiple Vitamin (MULTIVITAMIN) tablet, Take 1 tablet by mouth daily, Disp: , Rfl:     Probiotic Product (PROBIOTIC DAILY PO), Take by mouth, Disp: , Rfl:     Turmeric 500 MG CAPS, Take 1,000 mg by mouth daily, Disp: , Rfl:     VITAMIN D, CHOLECALCIFEROL, PO, Take by mouth, Disp: , Rfl:     LORazepam (ATIVAN) 1 mg tablet, Take 1 tablet (1 mg total) by mouth once in imaging for anxiety for up to 1 dose Prior to MRI (Patient not taking: Reported on 9/15/2020), Disp: 1 tablet, Rfl: 0      Active Problems     Patient Active Problem List   Diagnosis    Bladder wall thickening    Asthma in adult without complication    Erectile dysfunction of non-organic origin    Gastroesophageal reflux disease without esophagitis    Obesity (BMI 30 0-34  9)    Subacromial impingement of right shoulder    Osteoarthritis of right glenohumeral joint    Tendinitis of long head of biceps         Past Medical History     Past Medical History:   Diagnosis Date    Asthma     Crushing injury of hand and fingers 10/22/2012    Crush injury of fingers of right hand    Erectile dysfunction of non-organic origin     GERD (gastroesophageal reflux disease)     Shoulder joint pain 01/30/2013    Unspecified laterality    Umbilical hernia          Surgical History     Past Surgical History:   Procedure Laterality Date    CATARACT EXTRACTION, BILATERAL      FOOT SURGERY      HERNIA REPAIR      KNEE SURGERY      ME SHLDR ARTHROSCOP,SURG,REPAIR,SLAP LESION Left 5/2/2017    Procedure: ARTHROSCOPY SHOULDER W/ bicep tendodesis, SAD ;  Surgeon: Shanika Solis MD;  Location: [facility];  Service: Orthopedics         Family History     Family History   Problem Relation Age of Onset    No Known Problems Mother     No Known Problems Father     No Known Problems Family          Social History     Social History       Radiology     CT ABDOMEN AND PELVIS WITHOUT IV CONTRAST - LOW DOSE RENAL STONE      INDICATION:   Difficulty starting urinary stream few weeks, sensation of incomplete void x1 week, dysuria and hematuria X1d  Painful urinating  States blood and mucus in urine  Worsening symptoms for one week      COMPARISON:  None      TECHNIQUE:  Low dose thin section CT examination of the abdomen and pelvis was performed without intravenous or oral contrast according to a protocol specifically designed to evaluate for urinary tract calculus  Axial, sagittal, and coronal 2D   reformatted images were created from the source data and submitted for interpretation  Evaluation for pathology in the abdomen and pelvis that is unrelated to urinary tract calculi is limited     Radiation dose length product (DLP) for this visit:  413 mGy-cm   This examination, like all CT scans performed in the [facility], was performed utilizing techniques to minimize radiation dose exposure, including the use of iterative   reconstruction and automated exposure control       FINDINGS:     RIGHT KIDNEY AND URETER:  No urinary tract calculi  No hydronephrosis or hydroureter      LEFT KIDNEY AND URETER:  No urinary tract calculi  No hydronephrosis or hydroureter      URINARY BLADDER:   There is circumferential bladder wall thickening  Moderate amount of inflammatory changes surrounding the urinary bladder  There is elevation of the floor of the urinary bladder, secondary to a mildly enlarged prostate gland  No discrete cleavage   plane with the underlying prostate gland      Prostate gland mildly enlarged  The prostate gland is heterogeneous in CT density      No significant abnormality in the visualized lung bases      The liver is mildly enlarged with fatty infiltrative changes  No space-occupying lesions      Diffuse calcifications within the adrenal glands, likely due to prior hemorrhage or infection      Limited low radiation dose noncontrast CT evaluation demonstrates no clinically significant abnormality of spleen or pancreas      No calcified gallstones or gallbladder wall thickening noted      No ascites or bulky lymphadenopathy on this limited noncontrast study      Evaluation of the GI tract limited due to lack of oral contrast material      Stomach decompressed  Hiatal hernia      No evidence of small bowel obstruction      Scattered diverticula throughout the entire colon  Nothing to suggest acute diverticulitis      Limited evaluation demonstrates no evidence to suggest acute appendicitis      No acute fracture or destructive osseous lesion is identified    Degenerative changes in the lumbar spine, most pronounced L4-L5 and T5-A6      Small umbilical hernia containing fat  Small bilateral inguinal hernias containing fat      IMPRESSION:  1  No obstructing renal calculi  2   Diffuse abnormal appearance of the urinary bladder  Although the findings may be related to sequela of prostatic enlargement and bladder outlet obstruction, cystitis or bladder neoplasm not excluded  Recommend follow-up urologic consultation  3   Colonic diverticulosis  4   Hepatomegaly with fatty infiltration

## 2021-01-27 ENCOUNTER — OFFICE VISIT (OUTPATIENT)
Dept: FAMILY MEDICINE CLINIC | Facility: CLINIC | Age: 66
End: 2021-01-27
Payer: COMMERCIAL

## 2021-01-27 VITALS
WEIGHT: 217.5 LBS | RESPIRATION RATE: 14 BRPM | HEIGHT: 68 IN | SYSTOLIC BLOOD PRESSURE: 124 MMHG | HEART RATE: 80 BPM | BODY MASS INDEX: 32.96 KG/M2 | DIASTOLIC BLOOD PRESSURE: 76 MMHG | TEMPERATURE: 97.5 F

## 2021-01-27 DIAGNOSIS — Z00.00 ANNUAL PHYSICAL EXAM: Primary | ICD-10-CM

## 2021-01-27 DIAGNOSIS — J45.30 MILD PERSISTENT ASTHMA IN ADULT WITHOUT COMPLICATION: ICD-10-CM

## 2021-01-27 DIAGNOSIS — Z13.6 SCREENING FOR CARDIOVASCULAR CONDITION: ICD-10-CM

## 2021-01-27 DIAGNOSIS — E66.9 OBESITY (BMI 30.0-34.9): ICD-10-CM

## 2021-01-27 DIAGNOSIS — Z13.1 SCREENING FOR DIABETES MELLITUS: ICD-10-CM

## 2021-01-27 DIAGNOSIS — L28.0 LICHEN SIMPLEX CHRONICUS: ICD-10-CM

## 2021-01-27 PROBLEM — I83.93 VARICOSE VEINS OF LEGS: Status: ACTIVE | Noted: 2021-01-27

## 2021-01-27 PROCEDURE — 3725F SCREEN DEPRESSION PERFORMED: CPT | Performed by: FAMILY MEDICINE

## 2021-01-27 PROCEDURE — 99396 PREV VISIT EST AGE 40-64: CPT | Performed by: FAMILY MEDICINE

## 2021-01-27 PROCEDURE — 3008F BODY MASS INDEX DOCD: CPT | Performed by: FAMILY MEDICINE

## 2021-01-27 PROCEDURE — 1036F TOBACCO NON-USER: CPT | Performed by: FAMILY MEDICINE

## 2021-01-27 RX ORDER — HALOBETASOL PROPIONATE 0.05 %
OINTMENT (GRAM) TOPICAL 2 TIMES DAILY
Qty: 50 G | Refills: 0 | Status: SHIPPED | OUTPATIENT
Start: 2021-01-27 | End: 2022-02-16 | Stop reason: SDUPTHER

## 2021-01-27 NOTE — ASSESSMENT & PLAN NOTE
He has seen dermatology in the past about 6 years ago, diagnosed as having lichen simplex chronicus  He was prescribed halobetasol ointment  Recommended to take antihistamines at that time, as well as salicylate free diet  Prescribed steroid cream, reviewed potential adverse effects, discussed that he should not use for more than 2 weeks at a time   Recommended use of gentle soap, good moisturizing cream

## 2021-01-27 NOTE — ASSESSMENT & PLAN NOTE
Patient with symptomatic varicose veins with pruritis  Recommended use of compression stockings, elevating legs

## 2021-01-27 NOTE — PROGRESS NOTES
ANNUAL PHYSICAL    Date of Service: 21  Primary Care Provider:   Ashley Joyce MD       Name: Robin Vora       : 1955       Age:65 y o  Sex: male      MRN: [id number]      Chief Complaint:Annual Exam       Assessment and Plan:  60 y o  male exam      1  Health Maintenance  - Colon Cancer Screening: normal colonoscopy in 2014, repeat   - Prostate Cancer Screen: follows with urology   - Labs: as below  - Immunizations: Reviewed  Recommend yearly flu vaccine  2  Other diagnoses addressed today:   Problem List Items Addressed This Visit        Respiratory    Asthma in adult without complication     On Asmanex daily, well controlled for the most part  Follows with Allergist              Musculoskeletal and Integument    Lichen simplex chronicus     He has seen dermatology in the past about 6 years ago, diagnosed as having lichen simplex chronicus  He was prescribed halobetasol ointment  Recommended to take antihistamines at that time, as well as salicylate free diet  Prescribed steroid cream, reviewed potential adverse effects, discussed that he should not use for more than 2 weeks at a time  Recommended use of gentle soap, good moisturizing cream           Relevant Medications    halobetasol (ULTRAVATE) 0 05 % ointment       Other    Obesity (BMI 30 0-34 9)      Other Visit Diagnoses     Annual physical exam    -  Primary    Relevant Orders    Lipid panel    Basic metabolic panel    Screening for diabetes mellitus        Relevant Orders    Basic metabolic panel    Screening for cardiovascular condition        Relevant Orders    Lipid panel           Immunizations and preventive care screenings were discussed with patient today  Appropriate education was printed on patient's after visit summary  Counseling:  · Alcohol/drug use: discussed moderation in alcohol intake, the recommendations for healthy alcohol use, and avoidance of illicit drug use      · Dental Health: discussed importance of regular tooth brushing, flossing, and dental visits  · Injury prevention: discussed safety/seat belts, safety helmets, smoke detectors, carbon dioxide detectors  ·   · Exercise: the importance of regular exercise/physical activity was discussed  Recommend exercise 3-5 times per week for at least 30 minutes  BMI Counseling: Body mass index is 33 07 kg/m²  The BMI is above normal  Nutrition recommendations include encouraging healthy choices of fruits and vegetables and reducing intake of saturated and trans fat  Exercise recommendations include exercising 3-5 times per week  RTC 1 year for annual  visit or sooner PRN    Subjective:    Марина Faust is a 60 y o  male and is here for his comprehensive physical exam      Acute complaints:     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73  Rash  On trunk, upper, and lower extremities  The rash is pruritic, and in general He has been using Halobetasol cream which was prescribed by dermatology for rash on feet many years ago  He was diagnosed with lichen simplex chronicus  Asthma  He sees allergist for his asthma  Symptoms are controlled on inhaler  Varicose Veins  On bilateral lower extremities  Diet and Physical Activity  · Diet/Nutrition: does not follow a well balanced diet  · Exercise: no formal exercise  He has not been exercising as regularly  General Health  · Vision: no vision problems and goes for regular eye exams  · Dental: regular dental visits        Histories Updated and Reviewed 1/27/2021:  Patient's Medications   New Prescriptions    HALOBETASOL (ULTRAVATE) 0 05 % OINTMENT    Apply topically 2 (two) times a day for 14 days   Previous Medications    FOLIC ACID (FOLVITE) 595 MCG TABLET    Take 400 mcg by mouth daily    MOMETASONE (ASMANEX 120 METERED DOSES) 220 MCG/INH INHALER    Inhale daily     MULTIPLE VITAMIN (MULTIVITAMIN) TABLET    Take 1 tablet by mouth daily    PROBIOTIC PRODUCT (PROBIOTIC DAILY PO)    Take by mouth    TURMERIC 500 MG CAPS    Take 1,000 mg by mouth daily    VITAMIN D, CHOLECALCIFEROL, PO    Take 3,000 Units by mouth daily    Modified Medications    No medications on file   Discontinued Medications    LORAZEPAM (ATIVAN) 1 MG TABLET    Take 1 tablet (1 mg total) by mouth once in imaging for anxiety for up to 1 dose Prior to MRI     Allergies   Allergen Reactions    Other      Animal dander     Past Medical History:   Diagnosis Date    Asthma     Crushing injury of hand and fingers 10/22/2012    Crush injury of fingers of right hand    Erectile dysfunction of non-organic origin     GERD (gastroesophageal reflux disease)     Shoulder joint pain 01/30/2013    Unspecified laterality    Umbilical hernia      Social History     Socioeconomic History    Marital status:      Spouse name: Not on file    Number of children: Not on file    Years of education: Not on file    Highest education level: Not on file   Occupational History    Not on file   Social Needs    Financial resource strain: Not on file    Food insecurity     Worry: Not on file     Inability: Not on file    Transportation needs     Medical: Not on file     Non-medical: Not on file   Tobacco Use    Smoking status: Never Smoker    Smokeless tobacco: Never Used   Substance and Sexual Activity    Alcohol use: Yes     Comment: 'couple glasses' wine daily     Drug use: No    Sexual activity: Not on file   Lifestyle    Physical activity     Days per week: Not on file     Minutes per session: Not on file    Stress: Not on file   Relationships    Social connections     Talks on phone: Not on file     Gets together: Not on file     Attends Taoist service: Not on file     Active member of club or organization: Not on file     Attends meetings of clubs or organizations: Not on file     Relationship status: Not on file    Intimate partner violence     Fear of current or ex partner: Not on file     Emotionally abused: Not on file     Physically abused: Not on file     Forced sexual activity: Not on file   Other Topics Concern    Not on file   Social History Narrative    Drinks coffee     Immunization History   Administered Date(s) Administered    Tdap 10/19/2012    Zoster 08/13/2013       PHQ-9 Depression Screening    PHQ-9:   Frequency of the following problems over the past two weeks:      Little interest or pleasure in doing things: 0 - not at all  Feeling down, depressed, or hopeless: 0 - not at all  PHQ-2 Score: 0         Objective:  /76   Pulse 80   Temp 97 5 °F (36 4 °C)   Resp 14   Ht 5' 8" (1 727 m)   Wt 98 7 kg (217 lb 8 oz)   BMI 33 07 kg/m²   BP Readings from Last 3 Encounters:   01/27/21 124/76   09/15/20 136/82   02/13/20 131/81      Wt Readings from Last 3 Encounters:   01/27/21 98 7 kg (217 lb 8 oz)   09/15/20 99 kg (218 lb 3 2 oz)   02/13/20 98 kg (216 lb)      Physical Exam  Constitutional:       General: He is not in acute distress  Appearance: Normal appearance  He is not ill-appearing or toxic-appearing  HENT:      Head: Normocephalic and atraumatic  Right Ear: External ear normal       Left Ear: External ear normal       Nose: Nose normal       Mouth/Throat:      Mouth: Mucous membranes are moist    Eyes:      Extraocular Movements: Extraocular movements intact  Conjunctiva/sclera: Conjunctivae normal    Neck:      Musculoskeletal: Normal range of motion and neck supple  No neck rigidity  Cardiovascular:      Rate and Rhythm: Normal rate and regular rhythm  Pulses: Normal pulses  Heart sounds: Normal heart sounds  Pulmonary:      Effort: Pulmonary effort is normal  No respiratory distress  Breath sounds: Normal breath sounds  Abdominal:      General: There is no distension  Palpations: Abdomen is soft  Skin:     Findings: Rash present  No erythema  Rash is macular and papular        Comments: Maculopapular rash on bilateral upper arms, lower extremities  Obvious areas of pruritis with excoriations   Neurological:      General: No focal deficit present  Mental Status: He is alert and oriented to person, place, and time  Psychiatric:         Mood and Affect: Mood normal          Behavior: Behavior normal          Lab Results   Component Value Date    WBC 11 10 (H) 06/08/2018    HGB 14 9 06/08/2018    HCT 42 9 06/08/2018    MCV 92 06/08/2018     06/08/2018     Lab Results   Component Value Date    SODIUM 139 06/08/2018    K 4 1 06/08/2018     06/08/2018    CO2 28 06/08/2018    BUN 12 06/08/2018    CREATININE 0 89 06/08/2018    GLUC 101 06/08/2018    CALCIUM 9 2 06/08/2018     Lab Results   Component Value Date    CHOL 204 (H) 06/07/2017    HDL 80 06/07/2017    LDLCALC 113 (H) 06/07/2017    TRIG 57 06/07/2017         Patient Care Team:  Kylah Sandoval MD as PCP - General (Family Medicine)  Elias Wu MD as PCP - PCP-[facility] Yasmine Claudio MD    Note: Portions of the record may have been created with voice recognition software  Occasional wrong word or "sound a like" substitutions may have occurred due to the inherent limitations of voice recognition software  Read the chart carefully and recognize, using context, where substitutions have occurred

## 2021-01-27 NOTE — PATIENT INSTRUCTIONS

## 2021-01-29 ENCOUNTER — LAB (OUTPATIENT)
Dept: LAB | Facility: CLINIC | Age: 66
End: 2021-01-29
Payer: COMMERCIAL

## 2021-01-29 ENCOUNTER — TRANSCRIBE ORDERS (OUTPATIENT)
Dept: LAB | Facility: CLINIC | Age: 66
End: 2021-01-29

## 2021-01-29 DIAGNOSIS — Z13.6 SCREENING FOR CARDIOVASCULAR CONDITION: ICD-10-CM

## 2021-01-29 DIAGNOSIS — Z13.1 SCREENING FOR DIABETES MELLITUS: ICD-10-CM

## 2021-01-29 DIAGNOSIS — Z00.00 ANNUAL PHYSICAL EXAM: ICD-10-CM

## 2021-01-29 LAB
ANION GAP SERPL CALCULATED.3IONS-SCNC: 6 MMOL/L (ref 4–13)
BUN SERPL-MCNC: 13 MG/DL (ref 5–25)
CALCIUM SERPL-MCNC: 9.2 MG/DL (ref 8.3–10.1)
CHLORIDE SERPL-SCNC: 106 MMOL/L (ref 100–108)
CHOLEST SERPL-MCNC: 206 MG/DL (ref 50–200)
CO2 SERPL-SCNC: 29 MMOL/L (ref 21–32)
CREAT SERPL-MCNC: 1 MG/DL (ref 0.6–1.3)
GFR SERPL CREATININE-BSD FRML MDRD: 79 ML/MIN/1.73SQ M
GLUCOSE P FAST SERPL-MCNC: 102 MG/DL (ref 65–99)
HDLC SERPL-MCNC: 73 MG/DL
LDLC SERPL CALC-MCNC: 118 MG/DL (ref 0–100)
NONHDLC SERPL-MCNC: 133 MG/DL
POTASSIUM SERPL-SCNC: 4.3 MMOL/L (ref 3.5–5.3)
SODIUM SERPL-SCNC: 141 MMOL/L (ref 136–145)
TRIGL SERPL-MCNC: 77 MG/DL

## 2021-01-29 PROCEDURE — 80061 LIPID PANEL: CPT

## 2021-01-29 PROCEDURE — 36415 COLL VENOUS BLD VENIPUNCTURE: CPT

## 2021-01-29 PROCEDURE — 80048 BASIC METABOLIC PNL TOTAL CA: CPT

## 2021-05-03 ENCOUNTER — APPOINTMENT (OUTPATIENT)
Dept: LAB | Facility: CLINIC | Age: 66
End: 2021-05-03
Payer: COMMERCIAL

## 2021-05-03 ENCOUNTER — TELEMEDICINE (OUTPATIENT)
Dept: FAMILY MEDICINE CLINIC | Facility: CLINIC | Age: 66
End: 2021-05-03
Payer: COMMERCIAL

## 2021-05-03 ENCOUNTER — HOSPITAL ENCOUNTER (OUTPATIENT)
Dept: CT IMAGING | Facility: HOSPITAL | Age: 66
Discharge: HOME | End: 2021-05-03
Payer: COMMERCIAL

## 2021-05-03 ENCOUNTER — TELEPHONE (OUTPATIENT)
Dept: FAMILY MEDICINE CLINIC | Facility: CLINIC | Age: 66
End: 2021-05-03

## 2021-05-03 DIAGNOSIS — R10.9 ACUTE FLANK PAIN: Primary | ICD-10-CM

## 2021-05-03 DIAGNOSIS — R10.84 GENERALIZED ABDOMINAL PAIN: ICD-10-CM

## 2021-05-03 DIAGNOSIS — R10.9 ACUTE FLANK PAIN: ICD-10-CM

## 2021-05-03 DIAGNOSIS — R11.0 NAUSEA: Primary | ICD-10-CM

## 2021-05-03 DIAGNOSIS — K57.30 DIVERTICULOSIS OF COLON: ICD-10-CM

## 2021-05-03 LAB
ALBUMIN SERPL BCP-MCNC: 3.8 G/DL (ref 3.5–5)
ALP SERPL-CCNC: 64 U/L (ref 46–116)
ALT SERPL W P-5'-P-CCNC: 36 U/L (ref 12–78)
ANION GAP SERPL CALCULATED.3IONS-SCNC: 7 MMOL/L (ref 4–13)
AST SERPL W P-5'-P-CCNC: 16 U/L (ref 5–45)
BACTERIA UR QL AUTO: ABNORMAL /HPF
BASOPHILS # BLD AUTO: 0.01 THOUSANDS/ΜL (ref 0–0.1)
BASOPHILS NFR BLD AUTO: 0 % (ref 0–1)
BILIRUB SERPL-MCNC: 0.72 MG/DL (ref 0.2–1)
BILIRUB UR QL STRIP: ABNORMAL
BUN SERPL-MCNC: 10 MG/DL (ref 5–25)
CALCIUM SERPL-MCNC: 9.2 MG/DL (ref 8.3–10.1)
CAOX CRY URNS QL MICRO: ABNORMAL /HPF
CHLORIDE SERPL-SCNC: 110 MMOL/L (ref 100–108)
CLARITY UR: CLEAR
CO2 SERPL-SCNC: 25 MMOL/L (ref 21–32)
COLOR UR: ABNORMAL
CREAT SERPL-MCNC: 1.07 MG/DL (ref 0.6–1.3)
CRP SERPL QL: 4.8 MG/L
EOSINOPHIL # BLD AUTO: 0.66 THOUSAND/ΜL (ref 0–0.61)
EOSINOPHIL NFR BLD AUTO: 7 % (ref 0–6)
ERYTHROCYTE [DISTWIDTH] IN BLOOD BY AUTOMATED COUNT: 13 % (ref 11.6–15.1)
GFR SERPL CREATININE-BSD FRML MDRD: 73 ML/MIN/1.73SQ M
GLUCOSE SERPL-MCNC: 102 MG/DL (ref 65–140)
GLUCOSE UR STRIP-MCNC: NEGATIVE MG/DL
HCT VFR BLD AUTO: 44.6 % (ref 36.5–49.3)
HGB BLD-MCNC: 15.4 G/DL (ref 12–17)
HGB UR QL STRIP.AUTO: NEGATIVE
IMM GRANULOCYTES # BLD AUTO: 0.04 THOUSAND/UL (ref 0–0.2)
IMM GRANULOCYTES NFR BLD AUTO: 0 % (ref 0–2)
KETONES UR STRIP-MCNC: ABNORMAL MG/DL
LEUKOCYTE ESTERASE UR QL STRIP: NEGATIVE
LIPASE SERPL-CCNC: 312 U/L (ref 73–393)
LYMPHOCYTES # BLD AUTO: 2.06 THOUSANDS/ΜL (ref 0.6–4.47)
LYMPHOCYTES NFR BLD AUTO: 22 % (ref 14–44)
MCH RBC QN AUTO: 32.4 PG (ref 26.8–34.3)
MCHC RBC AUTO-ENTMCNC: 34.5 G/DL (ref 31.4–37.4)
MCV RBC AUTO: 94 FL (ref 82–98)
MONOCYTES # BLD AUTO: 0.88 THOUSAND/ΜL (ref 0.17–1.22)
MONOCYTES NFR BLD AUTO: 9 % (ref 4–12)
MUCOUS THREADS UR QL AUTO: ABNORMAL
NEUTROPHILS # BLD AUTO: 5.68 THOUSANDS/ΜL (ref 1.85–7.62)
NEUTS SEG NFR BLD AUTO: 62 % (ref 43–75)
NITRITE UR QL STRIP: NEGATIVE
NON-SQ EPI CELLS URNS QL MICRO: ABNORMAL /HPF
NRBC BLD AUTO-RTO: 0 /100 WBCS
PH UR STRIP.AUTO: 6 [PH]
PLATELET # BLD AUTO: 277 THOUSANDS/UL (ref 149–390)
PMV BLD AUTO: 9.1 FL (ref 8.9–12.7)
POTASSIUM SERPL-SCNC: 3.7 MMOL/L (ref 3.5–5.3)
PROT SERPL-MCNC: 7.1 G/DL (ref 6.4–8.2)
PROT UR STRIP-MCNC: NEGATIVE MG/DL
RBC # BLD AUTO: 4.75 MILLION/UL (ref 3.88–5.62)
RBC #/AREA URNS AUTO: ABNORMAL /HPF
SODIUM SERPL-SCNC: 142 MMOL/L (ref 136–145)
SP GR UR STRIP.AUTO: 1.03 (ref 1–1.03)
UROBILINOGEN UR QL STRIP.AUTO: 0.2 E.U./DL
WBC # BLD AUTO: 9.33 THOUSAND/UL (ref 4.31–10.16)
WBC #/AREA URNS AUTO: ABNORMAL /HPF

## 2021-05-03 PROCEDURE — 74176 CT ABD & PELVIS W/O CONTRAST: CPT

## 2021-05-03 PROCEDURE — 80053 COMPREHEN METABOLIC PANEL: CPT

## 2021-05-03 PROCEDURE — 85025 COMPLETE CBC W/AUTO DIFF WBC: CPT

## 2021-05-03 PROCEDURE — G1004 CDSM NDSC: HCPCS

## 2021-05-03 PROCEDURE — 99213 OFFICE O/P EST LOW 20 MIN: CPT | Performed by: FAMILY MEDICINE

## 2021-05-03 PROCEDURE — 86140 C-REACTIVE PROTEIN: CPT

## 2021-05-03 PROCEDURE — 81001 URINALYSIS AUTO W/SCOPE: CPT | Performed by: FAMILY MEDICINE

## 2021-05-03 PROCEDURE — 36415 COLL VENOUS BLD VENIPUNCTURE: CPT

## 2021-05-03 PROCEDURE — 83690 ASSAY OF LIPASE: CPT

## 2021-05-03 RX ORDER — ONDANSETRON 4 MG/1
4 TABLET, ORALLY DISINTEGRATING ORAL EVERY 6 HOURS PRN
Qty: 20 TABLET | Refills: 0 | Status: SHIPPED | OUTPATIENT
Start: 2021-05-03 | End: 2022-03-29 | Stop reason: ALTCHOICE

## 2021-05-03 NOTE — TELEPHONE ENCOUNTER
Patient informed  Scheduled CT scan for 5/2/21 at 6:30pm  Patient will fast for 3 hours and arrive 15 mins early and check in at main building   Will call patient with results

## 2021-05-03 NOTE — PROGRESS NOTES
Virtual Regular Visit      Assessment/Plan:    Problem List Items Addressed This Visit     None      Visit Diagnoses     Nausea    -  Primary    Relevant Medications    ondansetron (ZOFRAN-ODT) 4 mg disintegrating tablet    Generalized abdominal pain        Relevant Orders    Urinalysis with microscopic    Comprehensive metabolic panel    Lipase    Diverticulosis of colon        Relevant Orders    CBC and differential    C-reactive protein        Patient with abdominal pain, nausea, diarrhea for the last 4 days  Denies any sick contacts, eating anything new or different  Differential considered includes acute gastroenteritis, gastritis, diverticulitis  Denies red flag symptoms of fever, chills, blood in the stool  Will assess laboratory studies for liver function, pancreatic enzymes, inflammatory markers and CBC  Recommended trial of Pepcid, Pepto-Bismol for symptomatic relief  Recommended eating a basic diet such as the [employer]  Will follow-up laboratory studies, if significantly elevated white count or CRP would obtain CT scan of abdomen to assess for diverticulitis  Otherwise will pursue further workup with likely GI evaluation if symptoms do not improve with symptomatic treatment (antiacids) in the next 4 to 6 weeks  Reason for visit is   Chief Complaint   Patient presents with    Virtual Regular Visit        Encounter provider Jaden Smallwood MD    Provider located at [place] [address]  [phone]      Recent Visits  No visits were found meeting these conditions  Showing recent visits within past 7 days and meeting all other requirements     Today's Visits  Date Type Provider Dept   05/03/21 Telemedicine MD Bernardo Vinson   Showing today's visits and meeting all other requirements     Future Appointments  No visits were found meeting these conditions     Showing future appointments within next 150 days and meeting all other requirements        The patient was identified by name and date of birth  Hasmukh Payne was informed that this is a telemedicine visit and that the visit is being conducted [place] [facility] and patient was informed that this is a secure, HIPAA-compliant platform  He agrees to proceed     My office door was closed  No one else was in the room  He acknowledged consent and understanding of privacy and security of the video platform  The patient has agreed to participate and understands they can discontinue the visit at any time  Patient is aware this is a billable service  Ky Hong is a 60 y o  male who presents for virtual sick visit  HPI     He reports long standing history of GI Issues  He reports that he has added fiber supplement to his regimen several weeks ago  He reports last week he was not feeling well, this occurred about 4 days ago  He had episode of vomiting and then frequent diarrhea  He reports that he has abdominal pain with eating  He was nauseous after breakfast this morning, though nausea has improved since Thursday  He then had diarrhea after breakfast  He has been eating small amounts of food  He does have some reflux  He has been taking probiotic  He reports occasional pain in his lower back on the right side, that comes and goes since Thursday  He reports it is right about his belt line  The pain is achy pain, no radiation  He also reports occasional rash, though this is not new  The rash is not worse with current symptoms       Past Medical History:   Diagnosis Date    Asthma     Crushing injury of hand and fingers 10/22/2012    Crush injury of fingers of right hand    Erectile dysfunction of non-organic origin     GERD (gastroesophageal reflux disease)     Shoulder joint pain 01/30/2013    Unspecified laterality    Umbilical hernia        Past Surgical History:   Procedure Laterality Date    CATARACT EXTRACTION, BILATERAL      FOOT SURGERY      HERNIA REPAIR      KNEE SURGERY      MO SHLDR ARTHROSCOP,SURG,REPAIR,SLAP LESION Left 5/2/2017    Procedure: ARTHROSCOPY SHOULDER W/ bicep tendodesis, SAD ;  Surgeon: Nuris Kumar MD;  Location: [facility];  Service: Orthopedics       Current Outpatient Medications   Medication Sig Dispense Refill    folic acid (FOLVITE) 957 mcg tablet Take 400 mcg by mouth daily      halobetasol (ULTRAVATE) 0 05 % ointment Apply topically 2 (two) times a day for 14 days 50 g 0    mometasone (ASMANEX 120 METERED DOSES) 220 MCG/INH inhaler Inhale daily       Multiple Vitamin (MULTIVITAMIN) tablet Take 1 tablet by mouth daily      ondansetron (ZOFRAN-ODT) 4 mg disintegrating tablet Take 1 tablet (4 mg total) by mouth every 6 (six) hours as needed for nausea or vomiting 20 tablet 0    Probiotic Product (PROBIOTIC DAILY PO) Take by mouth      Turmeric 500 MG CAPS Take 1,000 mg by mouth daily      VITAMIN D, CHOLECALCIFEROL, PO Take 3,000 Units by mouth daily        No current facility-administered medications for this visit  Allergies   Allergen Reactions    Other      Animal dander       Review of Systems   Constitutional: Positive for appetite change  Negative for chills and fever  Gastrointestinal: Positive for abdominal pain, diarrhea, nausea and vomiting  Negative for blood in stool  Genitourinary: Negative for difficulty urinating and dysuria  Musculoskeletal: Positive for back pain  Skin: Positive for rash  Video Exam    There were no vitals filed for this visit  Physical Exam  Constitutional:       General: He is not in acute distress  Appearance: Normal appearance  He is normal weight  He is not ill-appearing or diaphoretic  HENT:      Head: Normocephalic and atraumatic        Right Ear: External ear normal       Left Ear: External ear normal       Nose: Nose normal       Mouth/Throat:      Mouth: Mucous membranes are moist    Eyes:      Extraocular Movements: Extraocular movements intact  Conjunctiva/sclera: Conjunctivae normal    Neck:      Musculoskeletal: Normal range of motion and neck supple  Pulmonary:      Effort: Pulmonary effort is normal  No respiratory distress  Musculoskeletal: Normal range of motion  Skin:     Findings: No erythema or rash  Neurological:      Mental Status: He is alert  Psychiatric:         Mood and Affect: Mood normal          Behavior: Behavior normal           I spent 15 minutes with patient today in which greater than 50% of the time was spent in counseling/coordination of care regarding impressions, instruction for managment  VIRTUAL VISIT DISCLAIMER    Corinna Summers acknowledges that he has consented to an online visit or consultation  He understands that the online visit is based solely on information provided by him, and that, in the absence of a face-to-face physical evaluation by the physician, the diagnosis he receives is both limited and provisional in terms of accuracy and completeness  This is not intended to replace a full medical face-to-face evaluation by the physician  Corinna Summers understands and accepts these terms

## 2021-05-03 NOTE — TELEPHONE ENCOUNTER
Please let patient know that overall his labs are normal  I do not suspect diverticulitis as cause of pain, or anything involving pancreas or liver  His urine has some calcium oxalate crystals which can indicate presence of kidney stones, symptoms could be due to kidney stone, though they are not convincing of this  Definitive diagnosis would be with CT scan, I will place order for CT scan   Please assist patient in scheduling this, thank you

## 2021-09-17 DIAGNOSIS — Z12.5 PROSTATE CANCER SCREENING: Primary | ICD-10-CM

## 2021-09-20 ENCOUNTER — APPOINTMENT (OUTPATIENT)
Dept: LAB | Facility: CLINIC | Age: 66
End: 2021-09-20
Payer: MEDICARE

## 2021-09-20 ENCOUNTER — OFFICE VISIT (OUTPATIENT)
Dept: UROLOGY | Facility: CLINIC | Age: 66
End: 2021-09-20
Payer: COMMERCIAL

## 2021-09-20 VITALS
BODY MASS INDEX: 33.34 KG/M2 | HEART RATE: 82 BPM | WEIGHT: 220 LBS | DIASTOLIC BLOOD PRESSURE: 84 MMHG | HEIGHT: 68 IN | SYSTOLIC BLOOD PRESSURE: 150 MMHG

## 2021-09-20 DIAGNOSIS — N30.01 ACUTE CYSTITIS WITH HEMATURIA: Primary | ICD-10-CM

## 2021-09-20 DIAGNOSIS — N52.9 ERECTILE DYSFUNCTION, UNSPECIFIED ERECTILE DYSFUNCTION TYPE: ICD-10-CM

## 2021-09-20 DIAGNOSIS — N13.8 BPH WITH OBSTRUCTION/LOWER URINARY TRACT SYMPTOMS: ICD-10-CM

## 2021-09-20 DIAGNOSIS — Z12.5 PROSTATE CANCER SCREENING: ICD-10-CM

## 2021-09-20 DIAGNOSIS — N40.1 BPH WITH OBSTRUCTION/LOWER URINARY TRACT SYMPTOMS: ICD-10-CM

## 2021-09-20 DIAGNOSIS — F52.21 ERECTILE DYSFUNCTION OF NON-ORGANIC ORIGIN: ICD-10-CM

## 2021-09-20 LAB — PSA SERPL-MCNC: 1.1 NG/ML (ref 0–4)

## 2021-09-20 PROCEDURE — G0103 PSA SCREENING: HCPCS

## 2021-09-20 PROCEDURE — 99213 OFFICE O/P EST LOW 20 MIN: CPT | Performed by: PHYSICIAN ASSISTANT

## 2021-09-20 PROCEDURE — 3008F BODY MASS INDEX DOCD: CPT | Performed by: PHYSICIAN ASSISTANT

## 2021-09-20 PROCEDURE — 84153 ASSAY OF PSA TOTAL: CPT

## 2021-09-20 PROCEDURE — 1036F TOBACCO NON-USER: CPT | Performed by: PHYSICIAN ASSISTANT

## 2021-09-20 RX ORDER — TADALAFIL 5 MG/1
5 TABLET ORAL DAILY PRN
Qty: 30 TABLET | Refills: 2 | Status: SHIPPED | OUTPATIENT
Start: 2021-09-20 | End: 2022-06-07

## 2021-09-20 NOTE — PROGRESS NOTES
Assessment/Plan:    Prostate cancer screening   prostate approximately 45-50 g, exam limited due to tight anal sphincter  No palpable nodules from what could be appreciated  PSA this year 1 1     will continue to follow yearly with digital rectal exam and PSA  For prostate cancer screening  Erectile dysfunction of non-organic origin    Patient currently reporting erectile dysfunction  Discussed with patient that will try Cialis 5 mg dosage daily as needed for erectile dysfunction  Also discussed that this does have beneficial affects for urination  Discussed with patient if he would like to continue to take this daily that we can discuss this in 6 weeks  Discussed with patient that he may increase dose to 10 mg  He should take this medication 1 hour prior to intercourse on an empty stomach  Discussed if he has erection lost 1 of the 4 hours he should go to the emergency room  Patient is currently agreeable to plan at this time  Will follow-up in 6 weeks for symptom reassessment  If patient is doing well he may follow-up in 1 year  BPH with obstruction/lower urinary tract symptoms    Patient with history of BPH with lower urinary tract symptoms  Not currently managed on medications  Patient currently reporting that he is doing well and does not want to take continue medication  He was placed on Cialis 5 mg daily as needed for erectile dysfunction  Discussed with patient that this medication can be used for BPH symptoms as well  Patient will follow-up in 6 weeks for reassessment on Cialis  If he would like to continue to take this medication daily for BPH along with erectile dysfunction can be discussed at next appointment  prostate approximately 45-50 g, exam limited due to tight anal sphincter     Problem List Items Addressed This Visit        Genitourinary    BPH with obstruction/lower urinary tract symptoms       Patient with history of BPH with lower urinary tract symptoms    Not currently managed on medications  Patient currently reporting that he is doing well and does not want to take continue medication  He was placed on Cialis 5 mg daily as needed for erectile dysfunction  Discussed with patient that this medication can be used for BPH symptoms as well  Patient will follow-up in 6 weeks for reassessment on Cialis  If he would like to continue to take this medication daily for BPH along with erectile dysfunction can be discussed at next appointment  Relevant Medications    tadalafil (CIALIS) 5 MG tablet       Other    Prostate cancer screening      prostate approximately 45-50 g, exam limited due to tight anal sphincter  No palpable nodules from what could be appreciated  PSA this year 1 1     will continue to follow yearly with digital rectal exam and PSA  For prostate cancer screening  Erectile dysfunction of non-organic origin       Patient currently reporting erectile dysfunction  Discussed with patient that will try Cialis 5 mg dosage daily as needed for erectile dysfunction  Also discussed that this does have beneficial affects for urination  Discussed with patient if he would like to continue to take this daily that we can discuss this in 6 weeks  Discussed with patient that he may increase dose to 10 mg  He should take this medication 1 hour prior to intercourse on an empty stomach  Discussed if he has erection lost 1 of the 4 hours he should go to the emergency room  Patient is currently agreeable to plan at this time  Will follow-up in 6 weeks for symptom reassessment  If patient is doing well he may follow-up in 1 year             Other Visit Diagnoses     Acute cystitis with hematuria    -  Primary    Relevant Medications    tadalafil (CIALIS) 5 MG tablet    Erectile dysfunction, unspecified erectile dysfunction type        Relevant Medications    tadalafil (CIALIS) 5 MG tablet            Subjective:      Patient ID: Yanet carrion 60 y o  male  HPI    patient is a 23-year-old male with history of BPH with lower urinary tract symptoms not currently interested in medications for urinary symptoms as he reports that these are non bothersome to him and he does not wish to start medications  Bladder wall thickening on noncontrast CT in 2018 with cystoscopy 2018 which revealed prostatomegaly and bladder  Trabeculations  Patient undergoes routine prostate cancer screening via urology  Most recent PSA 1 1  Patient currently reporting that he does continue to have urinary symptoms such as weak urinary stream, frequency and nocturia  He reports that these are not bothersome to him at this time is not currently interested in taking medication  He also is reporting erectile dysfunction  Interested in taking medication for this at this time  The following portions of the patient's history were reviewed and updated as appropriate:   He  has a past medical history of Asthma, Crushing injury of hand and fingers (10/22/2012), Erectile dysfunction of non-organic origin, GERD (gastroesophageal reflux disease), Shoulder joint pain (53/24/9386), and Umbilical hernia  He   Patient Active Problem List    Diagnosis Date Noted    BPH with obstruction/lower urinary tract symptoms 36/46/5816    Lichen simplex chronicus 01/27/2021    Varicose veins of legs 01/27/2021    Osteoarthritis of right glenohumeral joint 02/13/2020    Tendinitis of long head of biceps 02/13/2020    Subacromial impingement of right shoulder 10/21/2019    Obesity (BMI 30 0-34 9) 10/10/2018    Prostate cancer screening 08/14/2018    Bladder wall thickening 08/14/2018    Asthma in adult without complication 34/86/2293    Gastroesophageal reflux disease without esophagitis 10/15/2012    Erectile dysfunction of non-organic origin 08/30/2012     He  has a past surgical history that includes Hernia repair; pr shldr arthroscop,surg,repair,slap lesion (Left, 5/2/2017);  Knee surgery; Foot surgery; and Cataract extraction, bilateral   His family history includes No Known Problems in his family, father, and mother  He  reports that he has never smoked  He has never used smokeless tobacco  He reports current alcohol use  He reports that he does not use drugs  Current Outpatient Medications   Medication Sig Dispense Refill    folic acid (FOLVITE) 671 mcg tablet Take 400 mcg by mouth daily      mometasone (ASMANEX 120 METERED DOSES) 220 MCG/INH inhaler Inhale daily       Multiple Vitamin (MULTIVITAMIN) tablet Take 1 tablet by mouth daily      Probiotic Product (PROBIOTIC DAILY PO) Take by mouth      VITAMIN D, CHOLECALCIFEROL, PO Take 3,000 Units by mouth daily       halobetasol (ULTRAVATE) 0 05 % ointment Apply topically 2 (two) times a day for 14 days 50 g 0    ondansetron (ZOFRAN-ODT) 4 mg disintegrating tablet Take 1 tablet (4 mg total) by mouth every 6 (six) hours as needed for nausea or vomiting (Patient not taking: Reported on 9/20/2021) 20 tablet 0    tadalafil (CIALIS) 5 MG tablet Take 1 tablet (5 mg total) by mouth daily as needed for erectile dysfunction 30 tablet 2    Turmeric 500 MG CAPS Take 1,000 mg by mouth daily (Patient not taking: Reported on 9/20/2021)       No current facility-administered medications for this visit  He is allergic to other       Review of Systems   Constitutional: Negative  Negative for chills and fever  HENT: Negative  Eyes: Negative  Respiratory: Negative  Cardiovascular: Negative  Gastrointestinal: Negative  Negative for abdominal pain, diarrhea, nausea and vomiting  Endocrine: Negative  Genitourinary: Positive for difficulty urinating, frequency and urgency  Negative for dysuria, flank pain and hematuria  Musculoskeletal: Negative  Allergic/Immunologic: Negative  Neurological: Negative  Hematological: Negative  Psychiatric/Behavioral: Negative            Objective:      /84   Pulse 82   Ht 5' 8" (1 727 m)   Wt 99 8 kg (220 lb)   BMI 33 45 kg/m²          Physical Exam  Constitutional:       General: He is not in acute distress  Appearance: He is normal weight  He is not ill-appearing, toxic-appearing or diaphoretic  HENT:      Head: Normocephalic and atraumatic  Right Ear: External ear normal       Left Ear: External ear normal       Nose: Nose normal    Eyes:      General: No scleral icterus  Conjunctiva/sclera: Conjunctivae normal    Cardiovascular:      Rate and Rhythm: Normal rate  Pulses: Normal pulses  Pulmonary:      Effort: Pulmonary effort is normal  No respiratory distress  Genitourinary:     Comments:  prostate approximately 45-50 g, exam limited due to tight anal sphincter  No palpable nodules from what could be appreciated  Musculoskeletal:         General: Normal range of motion  Cervical back: Normal range of motion  Skin:     General: Skin is warm and dry  Neurological:      General: No focal deficit present  Mental Status: He is alert and oriented to person, place, and time  Psychiatric:         Mood and Affect: Mood normal          Behavior: Behavior normal          Thought Content:  Thought content normal          Judgment: Judgment normal            Leandro Lopez PA-C

## 2021-09-21 NOTE — ASSESSMENT & PLAN NOTE
Patient with history of BPH with lower urinary tract symptoms  Not currently managed on medications  Patient currently reporting that he is doing well and does not want to take continue medication  He was placed on Cialis 5 mg daily as needed for erectile dysfunction  Discussed with patient that this medication can be used for BPH symptoms as well  Patient will follow-up in 6 weeks for reassessment on Cialis  If he would like to continue to take this medication daily for BPH along with erectile dysfunction can be discussed at next appointment

## 2021-09-21 NOTE — ASSESSMENT & PLAN NOTE
prostate approximately 45-50 g, exam limited due to tight anal sphincter  No palpable nodules from what could be appreciated  PSA this year 1 1     will continue to follow yearly with digital rectal exam and PSA  For prostate cancer screening

## 2021-09-21 NOTE — ASSESSMENT & PLAN NOTE
Patient currently reporting erectile dysfunction  Discussed with patient that will try Cialis 5 mg dosage daily as needed for erectile dysfunction  Also discussed that this does have beneficial affects for urination  Discussed with patient if he would like to continue to take this daily that we can discuss this in 6 weeks  Discussed with patient that he may increase dose to 10 mg  He should take this medication 1 hour prior to intercourse on an empty stomach  Discussed if he has erection lost 1 of the 4 hours he should go to the emergency room  Patient is currently agreeable to plan at this time  Will follow-up in 6 weeks for symptom reassessment  If patient is doing well he may follow-up in 1 year

## 2021-11-09 ENCOUNTER — OFFICE VISIT (OUTPATIENT)
Dept: UROLOGY | Facility: CLINIC | Age: 66
End: 2021-11-09
Payer: COMMERCIAL

## 2021-11-09 VITALS
DIASTOLIC BLOOD PRESSURE: 82 MMHG | SYSTOLIC BLOOD PRESSURE: 130 MMHG | BODY MASS INDEX: 33.65 KG/M2 | HEIGHT: 68 IN | WEIGHT: 222 LBS

## 2021-11-09 DIAGNOSIS — N13.8 BPH WITH OBSTRUCTION/LOWER URINARY TRACT SYMPTOMS: Primary | ICD-10-CM

## 2021-11-09 DIAGNOSIS — N40.1 BPH WITH OBSTRUCTION/LOWER URINARY TRACT SYMPTOMS: Primary | ICD-10-CM

## 2021-11-09 PROCEDURE — 99213 OFFICE O/P EST LOW 20 MIN: CPT | Performed by: PHYSICIAN ASSISTANT

## 2022-01-24 ENCOUNTER — TELEMEDICINE (OUTPATIENT)
Dept: FAMILY MEDICINE CLINIC | Facility: CLINIC | Age: 67
End: 2022-01-24
Payer: COMMERCIAL

## 2022-01-24 DIAGNOSIS — U07.1 COVID-19: Primary | ICD-10-CM

## 2022-01-24 PROCEDURE — 99213 OFFICE O/P EST LOW 20 MIN: CPT | Performed by: FAMILY MEDICINE

## 2022-01-24 NOTE — PROGRESS NOTES
COVID-19 Outpatient Progress Note    Assessment/Plan:    Problem List Items Addressed This Visit     None      Visit Diagnoses     COVID-19    -  Primary         Disposition:     I recommended continued isolation until at least 24 hours have passed since recovery defined as resolution of fever without the use of fever-reducing medications AND improvement in COVID symptoms AND 10 days have passed since onset of symptoms (or 10 days have passed since date of first positive viral diagnostic test for asymptomatic patients)  Discussed symptom directed medication options with patient  Discussed vitamin D, vitamin C, and/or zinc supplementation with patient  Patient is now 7 days from onset of symptoms, therefore does not meet criteria for monoclonal or other oral antiviral therapies  Discussed that he should start to see gradual improvement in symptoms  Recommended ongoing supportive care with OTC medications, rest, hydration, reviewed return precautions  I have spent 15 minutes directly with the patient  Greater than 50% of this time was spent in counseling/coordination of care regarding: instructions for management, patient and family education and impressions  Encounter provider Americo Yung MD    Provider located at [place] [address]  [phone]    Recent Visits  No visits were found meeting these conditions  Showing recent visits within past 7 days and meeting all other requirements  Today's Visits  Date Type Provider Dept   01/24/22 Telemedicine Americo Yung MD [facility]   Showing today's visits and meeting all other requirements  Future Appointments  No visits were found meeting these conditions  Showing future appointments within next 150 days and meeting all other requirements     This virtual check-in was done via [employer] and patient was informed that this is a secure, HIPAA-compliant platform   He agrees to proceed  Patient agrees to participate in a virtual check in via telephone or video visit instead of presenting to the office to address urgent/immediate medical needs  Patient is aware this is a billable service  After connecting through [facility], the patient was identified by name and date of birth  Karyn Orona was informed that this was a telemedicine visit and that the exam was being conducted confidentially over secure lines  My office door was closed  No one else was in the room  Karyn Orona acknowledged consent and understanding of privacy and security of the telemedicine visit  I informed the patient that I have reviewed his record in Epic and presented the opportunity for him to ask any questions regarding the visit today  The patient agreed to participate  Verification of patient location:  Patient is located in the following state in which I hold an active license: [place]    Subjective:   Karyn Orona is a 73 y o  male who has been screened for COVID-19  Symptom change since last report: unchanged  Patient's symptoms include fever, chills, fatigue, malaise, cough, nausea and myalgias  Patient denies congestion, rhinorrhea, sore throat, anosmia, loss of taste, shortness of breath, chest tightness, abdominal pain, vomiting, diarrhea and headaches  - Date of symptom onset: 1/17/2022  - Date of positive COVID-19 test: 1/20/2022  Type of test: Home antigen  Home antigen result verified by provider  Will get picture of test uploaded/scanned into patient's chart  Patient with typical symptoms of COVID-19 and they attest that they were positive on home rapid antigen testing  Image of positive result is not able to be uploaded into their chart  COVID-19 vaccination status: Not vaccinated    He reports he has been having symptoms of fevers, myalgias  Last night he had fever of 102 9, this was the highest fever so far  Currently his temperature is 101   He has also had a cough, which is slightly worse than his usual cough  He has history of asthma, is on Asmanex twice daily and as needed albuterol  He has been using nasal rinse  He has been taking Tylenol, ibuprofen in the morning  No results found for: [address] [facility] [address], [address] [facility], [address] [facility], [facility] [address], [address] [facility]  Past Medical History:   Diagnosis Date    Asthma     Crushing injury of hand and fingers 10/22/2012    Crush injury of fingers of right hand    Erectile dysfunction of non-organic origin     GERD (gastroesophageal reflux disease)     Shoulder joint pain 01/30/2013    Unspecified laterality    Umbilical hernia      Past Surgical History:   Procedure Laterality Date    CATARACT EXTRACTION, BILATERAL      FOOT SURGERY      HERNIA REPAIR      KNEE SURGERY      LA SHLDR ARTHROSCOP,SURG,REPAIR,SLAP LESION Left 5/2/2017    Procedure: ARTHROSCOPY SHOULDER W/ bicep tendodesis, SAD ;  Surgeon: Tom Agustin MD;  Location: [facility];  Service: Orthopedics     Current Outpatient Medications   Medication Sig Dispense Refill    folic acid (FOLVITE) 563 mcg tablet Take 400 mcg by mouth daily      halobetasol (ULTRAVATE) 0 05 % ointment Apply topically 2 (two) times a day for 14 days 50 g 0    mometasone (ASMANEX 120 METERED DOSES) 220 MCG/INH inhaler Inhale daily       Multiple Vitamin (MULTIVITAMIN) tablet Take 1 tablet by mouth daily      ondansetron (ZOFRAN-ODT) 4 mg disintegrating tablet Take 1 tablet (4 mg total) by mouth every 6 (six) hours as needed for nausea or vomiting (Patient not taking: Reported on 9/20/2021) 20 tablet 0    Probiotic Product (PROBIOTIC DAILY PO) Take by mouth      tadalafil (CIALIS) 5 MG tablet Take 1 tablet (5 mg total) by mouth daily as needed for erectile dysfunction 30 tablet 2    Turmeric 500 MG CAPS Take 1,000 mg by mouth daily        VITAMIN D, CHOLECALCIFEROL, PO Take 3,000 Units by mouth daily        No current facility-administered medications for this visit       Allergies   Allergen Reactions    Other      Animal dander       Review of Systems   Constitutional: Positive for appetite change, chills, fatigue and fever  HENT: Negative for congestion, rhinorrhea and sore throat  Respiratory: Positive for cough  Negative for chest tightness and shortness of breath  Gastrointestinal: Positive for nausea  Negative for abdominal pain, diarrhea and vomiting  Musculoskeletal: Positive for myalgias  Neurological: Negative for headaches  Objective: There were no vitals filed for this visit  Physical Exam  Constitutional:       General: He is not in acute distress  Appearance: Normal appearance  He is not ill-appearing or toxic-appearing  HENT:      Nose: No congestion  Eyes:      Extraocular Movements: Extraocular movements intact  Conjunctiva/sclera: Conjunctivae normal    Pulmonary:      Effort: Pulmonary effort is normal  No respiratory distress  Comments: Able to speak in complete sentences without difficulty   Musculoskeletal:      Cervical back: Normal range of motion  No rigidity  Neurological:      Mental Status: He is alert  VIRTUAL VISIT DISCLAIMER    Dev Montana verbally agrees to participate in [facility]  Pt is aware that [facility] could be limited without vital signs or the ability to perform a full hands-on physical exam  Gonzalo Montana understands he or the provider may request at any time to terminate the video visit and request the patient to seek care or treatment in person

## 2022-02-14 ENCOUNTER — TELEPHONE (OUTPATIENT)
Dept: OTHER | Facility: OTHER | Age: 67
End: 2022-02-14

## 2022-02-14 NOTE — TELEPHONE ENCOUNTER
Pt is requesting a call back regarding a bill he received for his Telemedicine  1/24/2022  [facility]

## 2022-02-15 NOTE — PROGRESS NOTES
FAMILY MEDICINE PROGRESS NOTE    Date of Service: 22  Primary Care Provider:   Mayda Laguna MD       Name: Ton Murphy       : 1955       Age:66 y o  Sex: male      MRN: [id number]      Chief Complaint:Rash (All over body, especially feet  Worsening over the last few months)       ASSESSMENT and PLAN:  Ton Murphy is a 73 y o  male with:     Problem List Items Addressed This Visit        Musculoskeletal and Integument    Lichen simplex chronicus - Primary     He has had flare of lichen simplex  Will treat with 4 weeks of high potency steroid cream  IM steroid injection given today in the office, will try of avoid long term course of oral steroids  Prescription for hydroxyzine sent to the pharmacy; advised patient to avoid itching  Avoid long, hot showers, use good moisturizer  If symptoms do not improve, pursue skin biopsy  Follow-up in 4 weeks to assess response to treatment         Relevant Medications    halobetasol (ULTRAVATE) 0 05 % ointment    hydrOXYzine HCL (ATARAX) 25 mg tablet    methylPREDNISolone acetate (DEPO-MEDROL) injection 80 mg (Completed)      Other Visit Diagnoses     Pruritus        Relevant Medications    halobetasol (ULTRAVATE) 0 05 % ointment    hydrOXYzine HCL (ATARAX) 25 mg tablet          SUBJECTIVE:  Ton Murphy is a 73 y o  male who presents today with a chief complaint of Rash (All over body, especially feet  Worsening over the last few months)  HPI     He has had rash on body that has worsened over the last couple of months  It started on his legs and his now on the top of his feet  It is on his arms, back, stomach  The rash is very pruritic  He has been using halbetasol and calcipotriene  The halobetasol has been helpful, but the rash does come back  He has history of lichen simplex has seen dermatology before many years ago  He was treated with halobetasol and calcipotriene at that time  Review of Systems   Skin: Positive for rash       I have reviewed the patient's Past Medical History  Current Outpatient Medications:     folic acid (FOLVITE) 106 mcg tablet, Take 400 mcg by mouth daily, Disp: , Rfl:     mometasone (ASMANEX 120 METERED DOSES) 220 MCG/INH inhaler, Inhale daily , Disp: , Rfl:     Multiple Vitamin (MULTIVITAMIN) tablet, Take 1 tablet by mouth daily, Disp: , Rfl:     Probiotic Product (PROBIOTIC DAILY PO), Take by mouth, Disp: , Rfl:     VITAMIN D, CHOLECALCIFEROL, PO, Take 3,000 Units by mouth daily , Disp: , Rfl:     halobetasol (ULTRAVATE) 0 05 % ointment, Apply topically 2 (two) times a day for 28 days, Disp: 100 g, Rfl: 0    hydrOXYzine HCL (ATARAX) 25 mg tablet, Take 0 5-1 tablets (12 5-25 mg total) by mouth every 6 (six) hours as needed for itching, Disp: 90 tablet, Rfl: 0    ondansetron (ZOFRAN-ODT) 4 mg disintegrating tablet, Take 1 tablet (4 mg total) by mouth every 6 (six) hours as needed for nausea or vomiting (Patient not taking: Reported on 9/20/2021), Disp: 20 tablet, Rfl: 0    tadalafil (CIALIS) 5 MG tablet, Take 1 tablet (5 mg total) by mouth daily as needed for erectile dysfunction, Disp: 30 tablet, Rfl: 2    Turmeric 500 MG CAPS, Take 1,000 mg by mouth daily   (Patient not taking: Reported on 2/16/2022 ), Disp: , Rfl:   No current facility-administered medications for this visit  OBJECTIVE:  /78 (BP Location: Left arm, Patient Position: Sitting, Cuff Size: Large)   Pulse 81   Temp (!) 96 2 °F (35 7 °C)   Resp 18   Ht 5' 8" (1 727 m)   Wt 98 4 kg (217 lb)   SpO2 97%   BMI 32 99 kg/m²    BP Readings from Last 3 Encounters:   02/16/22 122/78   11/09/21 130/82   09/20/21 150/84      Wt Readings from Last 3 Encounters:   02/16/22 98 4 kg (217 lb)   11/09/21 101 kg (222 lb)   09/20/21 99 8 kg (220 lb)      Physical Exam  Constitutional:       General: He is not in acute distress  Appearance: Normal appearance  He is not ill-appearing or toxic-appearing     Pulmonary:      Effort: Pulmonary effort is normal  No respiratory distress  Skin:     Findings: Rash present  Rash is papular and scaling  Comments: Scaling patches with serous weeping on dorsal aspect of both feet  Papular rash on arms, lower back    Neurological:      Mental Status: He is alert  Return in about 5 weeks (around 3/21/2022) for follow-up, Welcome to Medicare   Christine Valle MD    Note: Portions of the record have been created with voice recognition software  Occasional wrong word or "sound a like" substitutions may have occurred due to the inherent limitations of voice recognition software  Read the chart carefully and recognize, using context, where substitutions have occurred

## 2022-02-16 ENCOUNTER — OFFICE VISIT (OUTPATIENT)
Dept: FAMILY MEDICINE CLINIC | Facility: CLINIC | Age: 67
End: 2022-02-16
Payer: COMMERCIAL

## 2022-02-16 VITALS
HEIGHT: 68 IN | WEIGHT: 217 LBS | HEART RATE: 81 BPM | RESPIRATION RATE: 18 BRPM | BODY MASS INDEX: 32.89 KG/M2 | DIASTOLIC BLOOD PRESSURE: 78 MMHG | TEMPERATURE: 96.2 F | OXYGEN SATURATION: 97 % | SYSTOLIC BLOOD PRESSURE: 122 MMHG

## 2022-02-16 DIAGNOSIS — L28.0 LICHEN SIMPLEX CHRONICUS: Primary | ICD-10-CM

## 2022-02-16 DIAGNOSIS — L29.9 PRURITUS: ICD-10-CM

## 2022-02-16 PROCEDURE — 3288F FALL RISK ASSESSMENT DOCD: CPT | Performed by: FAMILY MEDICINE

## 2022-02-16 PROCEDURE — 3008F BODY MASS INDEX DOCD: CPT | Performed by: FAMILY MEDICINE

## 2022-02-16 PROCEDURE — 1036F TOBACCO NON-USER: CPT | Performed by: FAMILY MEDICINE

## 2022-02-16 PROCEDURE — 96372 THER/PROPH/DIAG INJ SC/IM: CPT | Performed by: FAMILY MEDICINE

## 2022-02-16 PROCEDURE — 1160F RVW MEDS BY RX/DR IN RCRD: CPT | Performed by: FAMILY MEDICINE

## 2022-02-16 PROCEDURE — 99213 OFFICE O/P EST LOW 20 MIN: CPT | Performed by: FAMILY MEDICINE

## 2022-02-16 PROCEDURE — 1101F PT FALLS ASSESS-DOCD LE1/YR: CPT | Performed by: FAMILY MEDICINE

## 2022-02-16 PROCEDURE — 3725F SCREEN DEPRESSION PERFORMED: CPT | Performed by: FAMILY MEDICINE

## 2022-02-16 RX ORDER — HYDROXYZINE HYDROCHLORIDE 25 MG/1
12.5-25 TABLET, FILM COATED ORAL EVERY 6 HOURS PRN
Qty: 90 TABLET | Refills: 0 | Status: SHIPPED | OUTPATIENT
Start: 2022-02-16

## 2022-02-16 RX ORDER — METHYLPREDNISOLONE ACETATE 80 MG/ML
80 INJECTION, SUSPENSION INTRA-ARTICULAR; INTRALESIONAL; INTRAMUSCULAR; SOFT TISSUE ONCE
Status: COMPLETED | OUTPATIENT
Start: 2022-02-16 | End: 2022-02-16

## 2022-02-16 RX ORDER — HALOBETASOL PROPIONATE 0.05 %
OINTMENT (GRAM) TOPICAL 2 TIMES DAILY
Qty: 100 G | Refills: 0 | Status: SHIPPED | OUTPATIENT
Start: 2022-02-16 | End: 2022-06-07

## 2022-02-16 RX ADMIN — METHYLPREDNISOLONE ACETATE 80 MG: 80 INJECTION, SUSPENSION INTRA-ARTICULAR; INTRALESIONAL; INTRAMUSCULAR; SOFT TISSUE at 11:42

## 2022-02-16 NOTE — ASSESSMENT & PLAN NOTE
He has had flare of lichen simplex  Will treat with 4 weeks of high potency steroid cream  IM steroid injection given today in the office, will try of avoid long term course of oral steroids  Prescription for hydroxyzine sent to the pharmacy; advised patient to avoid itching  Avoid long, hot showers, use good moisturizer  If symptoms do not improve, pursue skin biopsy   Follow-up in 4 weeks to assess response to treatment

## 2022-03-22 ENCOUNTER — RA CDI HCC (OUTPATIENT)
Dept: OTHER | Facility: HOSPITAL | Age: 67
End: 2022-03-22

## 2022-03-22 NOTE — PROGRESS NOTES
[facility]  coding opportunities       Chart reviewed, no opportunity found: CHART REVIEWED, NO OPPORTUNITY FOUND        Patients Insurance        Commercial Insurance: [address] [place] [address]

## 2022-03-28 NOTE — ASSESSMENT & PLAN NOTE
Lab Results   Component Value Date    PSA 1 1 09/20/2021    PSA 1 3 09/10/2020    PSA 1 2 09/10/2019     Follows with urology  Stable PSA, no symptoms

## 2022-03-29 ENCOUNTER — OFFICE VISIT (OUTPATIENT)
Dept: FAMILY MEDICINE CLINIC | Facility: CLINIC | Age: 67
End: 2022-03-29
Payer: COMMERCIAL

## 2022-03-29 VITALS
HEIGHT: 67 IN | OXYGEN SATURATION: 96 % | TEMPERATURE: 95.9 F | DIASTOLIC BLOOD PRESSURE: 82 MMHG | WEIGHT: 215 LBS | HEART RATE: 66 BPM | BODY MASS INDEX: 33.74 KG/M2 | SYSTOLIC BLOOD PRESSURE: 116 MMHG | RESPIRATION RATE: 18 BRPM

## 2022-03-29 DIAGNOSIS — L28.0 LICHEN SIMPLEX CHRONICUS: ICD-10-CM

## 2022-03-29 DIAGNOSIS — N13.8 BPH WITH OBSTRUCTION/LOWER URINARY TRACT SYMPTOMS: ICD-10-CM

## 2022-03-29 DIAGNOSIS — Z13.1 SCREENING FOR DIABETES MELLITUS: ICD-10-CM

## 2022-03-29 DIAGNOSIS — Z00.00 WELCOME TO MEDICARE PREVENTIVE VISIT: Primary | ICD-10-CM

## 2022-03-29 DIAGNOSIS — N40.1 BPH WITH OBSTRUCTION/LOWER URINARY TRACT SYMPTOMS: ICD-10-CM

## 2022-03-29 DIAGNOSIS — E66.9 OBESITY (BMI 30.0-34.9): ICD-10-CM

## 2022-03-29 DIAGNOSIS — J45.30 MILD PERSISTENT ASTHMA IN ADULT WITHOUT COMPLICATION: ICD-10-CM

## 2022-03-29 DIAGNOSIS — Z13.220 SCREENING FOR HYPERLIPIDEMIA: ICD-10-CM

## 2022-03-29 DIAGNOSIS — Z11.59 NEED FOR HEPATITIS C SCREENING TEST: ICD-10-CM

## 2022-03-29 DIAGNOSIS — K21.9 GASTROESOPHAGEAL REFLUX DISEASE WITHOUT ESOPHAGITIS: ICD-10-CM

## 2022-03-29 PROCEDURE — 3008F BODY MASS INDEX DOCD: CPT | Performed by: FAMILY MEDICINE

## 2022-03-29 PROCEDURE — 1123F ACP DISCUSS/DSCN MKR DOCD: CPT | Performed by: FAMILY MEDICINE

## 2022-03-29 PROCEDURE — G0403 EKG FOR INITIAL PREVENT EXAM: HCPCS | Performed by: FAMILY MEDICINE

## 2022-03-29 PROCEDURE — G0402 INITIAL PREVENTIVE EXAM: HCPCS | Performed by: FAMILY MEDICINE

## 2022-03-29 PROCEDURE — 99173 VISUAL ACUITY SCREEN: CPT | Performed by: FAMILY MEDICINE

## 2022-03-29 NOTE — ASSESSMENT & PLAN NOTE
Continue topical steroids as needed, discussed that tinea is a possible side effect   Recommended topical antifungals

## 2022-03-29 NOTE — PROGRESS NOTES
Appears to be in a macrocytic in nature  No recent vitamin B12 or folate found on file, has evidence of hypothyroidism from thyroid studies from May of 2020  Likely absorption issues secondary to her gastric bypass history  Continue iron, folic acid  Check vitamin B12, folate  Continue to monitor hemoglobin Assessment and Plan:     Problem List Items Addressed This Visit        Digestive    Gastroesophageal reflux disease without esophagitis     Controlled without medications             Respiratory    Asthma in adult without complication     Follows with Allergist  Controlled on daily Asmanex inhaler             Musculoskeletal and Integument    Lichen simplex chronicus     Continue topical steroids as needed, discussed that tinea is a possible side effect  Recommended topical antifungals             Genitourinary    BPH with obstruction/lower urinary tract symptoms     Lab Results   Component Value Date    PSA 1 1 09/20/2021    PSA 1 3 09/10/2020    PSA 1 2 09/10/2019     Follows with urology  Stable PSA, no symptoms             Other    Obesity (BMI 30 0-34 9)      Other Visit Diagnoses     Welcome to Medicare preventive visit    -  Primary    Relevant Orders    POCT ECG (Completed)    Need for hepatitis C screening test        Relevant Orders    Hepatitis C Antibody ([facility])    Screening for diabetes mellitus        Relevant Orders    Comprehensive metabolic panel    Screening for hyperlipidemia        Relevant Orders    Lipid panel        BMI Counseling: Body mass index is 33 67 kg/m²  The BMI is above normal  Nutrition recommendations include decreasing portion sizes, encouraging healthy choices of fruits and vegetables, consuming healthier snacks and reducing intake of saturated and trans fat  Exercise recommendations include moderate physical activity 150 minutes/week  Rationale for BMI follow-up plan is due to patient being overweight or obese  Depression Screening and Follow-up Plan: Patient was screened for depression during today's encounter  They screened negative with a PHQ-2 score of 0  Preventive health issues were discussed with patient, and age appropriate screening tests were ordered as noted in patient's After Visit Summary    Personalized health advice and appropriate referrals for health education or preventive services given if needed, as noted in patient's After Visit Summary  History of Present Illness:     Patient presents for Welcome to Medicare visit  Patient Care Team:  Mauricio Maynard MD as PCP - General (Family Medicine)  Jo Berger MD as PCP - PCP-[facility] Attributed-Roster     Review of Systems:     Review of Systems   Constitutional: Negative for activity change, appetite change and fatigue  Respiratory: Negative for cough and shortness of breath  Cardiovascular: Negative for chest pain, palpitations and leg swelling  Gastrointestinal: Negative for abdominal pain, blood in stool, constipation and diarrhea  Genitourinary: Negative for difficulty urinating, dysuria, flank pain, frequency and urgency  Skin: Positive for rash  Problem List:     Patient Active Problem List   Diagnosis    Bladder wall thickening    Asthma in adult without complication    Erectile dysfunction of non-organic origin    Gastroesophageal reflux disease without esophagitis    Obesity (BMI 30 0-34  9)    Subacromial impingement of right shoulder    Osteoarthritis of right glenohumeral joint    Tendinitis of long head of biceps    Lichen simplex chronicus    Varicose veins of legs    BPH with obstruction/lower urinary tract symptoms      Past Medical and Surgical History:     Past Medical History:   Diagnosis Date    Asthma     Crushing injury of hand and fingers 10/22/2012    Crush injury of fingers of right hand    Erectile dysfunction of non-organic origin     GERD (gastroesophageal reflux disease)     Shoulder joint pain 01/30/2013    Unspecified laterality    Umbilical hernia      Past Surgical History:   Procedure Laterality Date    CATARACT EXTRACTION, BILATERAL      FOOT SURGERY      HERNIA REPAIR      KNEE SURGERY      IL SHLDR ARTHROSCOP,SURG,REPAIR,SLAP LESION Left 5/2/2017    Procedure: ARTHROSCOPY SHOULDER W/ bicep tendodesis, SAD ; Surgeon: Lesley Beaver MD;  Location: [facility];  Service: Orthopedics      Family History:     Family History   Problem Relation Age of Onset    No Known Problems Mother     No Known Problems Father     No Known Problems Family       Social History:     Social History     Socioeconomic History    Marital status:      Spouse name: None    Number of children: None    Years of education: None    Highest education level: None   Occupational History    None   Tobacco Use    Smoking status: Never Smoker    Smokeless tobacco: Never Used   Vaping Use    Vaping Use: Never used   Substance and Sexual Activity    Alcohol use: Yes     Comment: 'couple glasses' wine daily     Drug use: No    Sexual activity: Yes     Partners: Female     Birth control/protection: None   Other Topics Concern    None   Social History Narrative    Drinks coffee     Social Determinants of Health     Financial Resource Strain: Not on file   Food Insecurity: Not on file   Transportation Needs: Not on file   Physical Activity: Not on file   Stress: Not on file   Social Connections: Not on file   Intimate Partner Violence: Not on file   Housing Stability: Not on file      Medications and Allergies:     Current Outpatient Medications   Medication Sig Dispense Refill    folic acid (FOLVITE) 458 mcg tablet Take 400 mcg by mouth daily      mometasone (ASMANEX 120 METERED DOSES) 220 MCG/INH inhaler Inhale daily       Multiple Vitamin (MULTIVITAMIN) tablet Take 1 tablet by mouth daily      Probiotic Product (PROBIOTIC DAILY PO) Take by mouth      Turmeric 500 MG CAPS Take 1,000 mg by mouth daily        VITAMIN D, CHOLECALCIFEROL, PO Take 3,000 Units by mouth daily       halobetasol (ULTRAVATE) 0 05 % ointment Apply topically 2 (two) times a day for 28 days 100 g 0    hydrOXYzine HCL (ATARAX) 25 mg tablet Take 0 5-1 tablets (12 5-25 mg total) by mouth every 6 (six) hours as needed for itching (Patient not taking: Reported on 3/29/2022 ) 90 tablet 0    tadalafil (CIALIS) 5 MG tablet Take 1 tablet (5 mg total) by mouth daily as needed for erectile dysfunction 30 tablet 2     No current facility-administered medications for this visit  Allergies   Allergen Reactions    Other Allergic Rhinitis     Animal dander      Immunizations:     Immunization History   Administered Date(s) Administered    Tdap 10/19/2012    Zoster 08/13/2013      Health Maintenance:         Topic Date Due    Hepatitis C Screening  Never done    HIV Screening  Never done    Colorectal Cancer Screening  08/18/2024     There are no preventive care reminders to display for this patient  Medicare Screening Tests and Risk Assessments:     Liliana Duffy is here for his Welcome to Medicare visit  Health Risk Assessment:   Patient rates overall health as very good  Patient feels that their physical health rating is same  Patient is very satisfied with their life  Eyesight was rated as same  Hearing was rated as same  Patient feels that their emotional and mental health rating is same  Patients states they are never, rarely angry  Patient states they are sometimes unusually tired/fatigued  Pain experienced in the last 7 days has been none  Patient states that he has experienced weight loss or gain in last 6 months  Depression Screening:   PHQ-2 Score: 0      Fall Risk Screening: In the past year, patient has experienced: no history of falling in past year      Home Safety:  Patient does not have trouble with stairs inside or outside of their home  Patient has working smoke alarms and has working carbon monoxide detector  Home safety hazards include: none  Nutrition:   Current diet is Regular  Medications:   Patient is currently taking over-the-counter supplements  OTC medications include: Multi vitamin, Probiotic, Vitamin D3, Turmeric  Patient is able to manage medications       Activities of Daily Living (ADLs)/Instrumental Activities of Daily Living (IADLs):   Walk and transfer into and out of bed and chair?: Yes  Dress and groom yourself?: Yes    Bathe or shower yourself?: Yes    Feed yourself? Yes  Do your laundry/housekeeping?: Yes  Manage your money, pay your bills and track your expenses?: Yes  Make your own meals?: Yes    Do your own shopping?: Yes    Previous Hospitalizations:   Any hospitalizations or ED visits within the last 12 months?: No      Advance Care Planning:   Living will: Yes    Durable POA for healthcare: Yes    Advanced directive: Yes    Advanced directive counseling given: Yes      Cognitive Screening:   Provider or family/friend/caregiver concerned regarding cognition?: No    PREVENTIVE SCREENINGS      Cardiovascular Screening:    General: Screening Current      Diabetes Screening:     General: Screening Current      Colorectal Cancer Screening:     General: Screening Current      Prostate Cancer Screening:    General: Screening Current      Abdominal Aortic Aneurysm (AAA) Screening:    Risk factors include: age between 73-69 yo        Lung Cancer Screening:     General: Screening Not Indicated    Screening, Brief Intervention, and Referral to Treatment (SBIRT)    Screening  Typical number of drinks in a day: 2  Typical number of drinks in a week: 15  Interpretation: Low risk drinking behavior  AUDIT-C Screenin) How often did you have a drink containing alcohol in the past year? 4 or more times a week  2) How many drinks did you have on a typical day when you were drinking in the past year? 1 to 2  3) How often did you have 6 or more drinks on one occasion in the past year? never    AUDIT-C Score: 4  Interpretation: Score 4-12 (male): POSITIVE screen for alcohol misuse    AUDIT Screenin) How often during the last year have you found that you were not able to stop drinking once you had started?  0 - never  5) How often during the last year have you failed to do what was normally expected from you because of drinking? 0 - never  6) How often during the last year have you needed a first drink in the morning to get yourself going after a heavy drinking session? 0 - never  7) How often during the last year have you had a feeling of guilt or remorse after drinking? 0 - never  8) How often during the last year have you been unable to remember what happened the night before because you had been drinking? 0 - never  9) Have you or someone else been injured as a result of your drinking? 0 - no  10) Has a relative or friend or a doctor or another health worker been concerned about your drinking or suggested you cut down? 0 - no    AUDIT Score: 4  Interpretation: Low risk alcohol consumption    Single Item Drug Screening:  How often have you used an illegal drug (including marijuana) or a prescription medication for non-medical reasons in the past year? never    Single Item Drug Screen Score: 0  Interpretation: Negative screen for possible drug use disorder    Brief Intervention  Alcohol & drug use screenings were reviewed  No concerns regarding substance use disorder identified  Healthy alcohol use/limits discussed  Other Counseling Topics:   Skin self-exam and regular weightbearing exercise  Visual Acuity Screening    Right eye Left eye Both eyes   Without correction: 20/20 20/25 20/20   With correction:           Physical Exam:     /82 (BP Location: Left arm, Patient Position: Sitting, Cuff Size: Large)   Pulse 66   Temp (!) 95 9 °F (35 5 °C)   Resp 18   Ht 5' 7" (1 702 m)   Wt 97 5 kg (215 lb)   SpO2 96%   BMI 33 67 kg/m²     Physical Exam  Constitutional:       General: He is not in acute distress  Appearance: Normal appearance  He is obese  He is not ill-appearing or toxic-appearing  HENT:      Head: Normocephalic and atraumatic        Right Ear: Tympanic membrane, ear canal and external ear normal       Left Ear: Tympanic membrane, ear canal and external ear normal       Nose: Nose normal       Mouth/Throat: Mouth: Mucous membranes are moist    Eyes:      Extraocular Movements: Extraocular movements intact  Conjunctiva/sclera: Conjunctivae normal    Cardiovascular:      Rate and Rhythm: Normal rate and regular rhythm  Pulses: Normal pulses  Heart sounds: Normal heart sounds  No murmur heard  No friction rub  Pulmonary:      Effort: Pulmonary effort is normal  No respiratory distress  Breath sounds: Normal breath sounds  No stridor  No wheezing, rhonchi or rales  Abdominal:      General: There is no distension  Palpations: Abdomen is soft  Tenderness: There is no abdominal tenderness  Musculoskeletal:      Cervical back: Normal range of motion and neck supple  No rigidity or tenderness  Right lower leg: No edema  Left lower leg: No edema  Lymphadenopathy:      Cervical: No cervical adenopathy  Skin:     Findings: Rash (tinea on feet) present  No erythema  Neurological:      General: No focal deficit present  Mental Status: He is alert and oriented to person, place, and time     Psychiatric:         Mood and Affect: Mood normal          Behavior: Behavior normal           Terell Almeida MD

## 2022-03-29 NOTE — PATIENT INSTRUCTIONS

## 2022-06-01 ENCOUNTER — OFFICE VISIT (OUTPATIENT)
Dept: FAMILY MEDICINE CLINIC | Facility: CLINIC | Age: 67
End: 2022-06-01
Payer: COMMERCIAL

## 2022-06-01 VITALS
RESPIRATION RATE: 18 BRPM | HEART RATE: 92 BPM | WEIGHT: 214 LBS | BODY MASS INDEX: 33.59 KG/M2 | OXYGEN SATURATION: 98 % | HEIGHT: 67 IN | DIASTOLIC BLOOD PRESSURE: 78 MMHG | SYSTOLIC BLOOD PRESSURE: 136 MMHG | TEMPERATURE: 97 F

## 2022-06-01 DIAGNOSIS — U07.1 COVID-19: ICD-10-CM

## 2022-06-01 DIAGNOSIS — L03.116 CELLULITIS OF LEFT LOWER EXTREMITY: Primary | ICD-10-CM

## 2022-06-01 PROCEDURE — 99213 OFFICE O/P EST LOW 20 MIN: CPT | Performed by: FAMILY MEDICINE

## 2022-06-01 RX ORDER — PSEUDOEPHED/ACETAMINOPH/DIPHEN 30MG-500MG
2 TABLET ORAL EVERY 8 HOURS PRN
COMMUNITY
Start: 2022-04-08

## 2022-06-01 RX ORDER — IBUPROFEN 200 MG
200 TABLET ORAL EVERY 6 HOURS PRN
COMMUNITY

## 2022-06-01 RX ORDER — CEPHALEXIN 500 MG/1
500 CAPSULE ORAL EVERY 12 HOURS SCHEDULED
Qty: 14 CAPSULE | Refills: 0 | Status: SHIPPED | OUTPATIENT
Start: 2022-06-01 | End: 2022-06-07 | Stop reason: SDUPTHER

## 2022-06-01 NOTE — PROGRESS NOTES
FAMILY MEDICINE PROGRESS NOTE    Date of Service: 22  Primary Care Provider:   Brittany Guo MD       Name: Marleni Pang       : 1955       Age:66 y o  Sex: male      MRN: [id number]      Chief Complaint:Insect Bite (2022- Red, swollen painful )       ASSESSMENT and PLAN:  Marleni Pang is a 73 y o  male with:     Problem List Items Addressed This Visit    None     Visit Diagnoses     Cellulitis of left lower extremity    -  Primary    Relevant Medications    cephalexin (KEFLEX) 500 mg capsule    COVID-19            Patient with cellulitis of left lower extremity  Will treat with 7 days of Keflex  Reviewed ER and return precautions  SUBJECTIVE:  Marleni Pang is a 73 y o  male who presents today with a chief complaint of Insect Bite (2022- Red, swollen painful )  HPI     He tested positive for COVID last week on Friday, he has only very mild symptoms  His girlfriend had COVID so that's why he tested  He has has only a runny nose  He noticed redness and swelling of his left ankle yesterday morning  He wonders if he got a bug bite while working in the yard on   Review of Systems   Constitutional: Negative for chills and fever  HENT: Positive for rhinorrhea  Cardiovascular: Positive for leg swelling  Skin: Positive for color change  I have reviewed the patient's Past Medical History      Current Outpatient Medications:     cephalexin (KEFLEX) 500 mg capsule, Take 1 capsule (500 mg total) by mouth every 12 (twelve) hours for 7 days, Disp: 14 capsule, Rfl: 0    folic acid (FOLVITE) 713 mcg tablet, Take 400 mcg by mouth daily, Disp: , Rfl:     ibuprofen (MOTRIN) 200 mg tablet, Take 200 mg by mouth every 6 (six) hours as needed for mild pain, Disp: , Rfl:     mometasone (ASMANEX TWISTHALER) 220 MCG/INH inhaler, Inhale daily , Disp: , Rfl:     Multiple Vitamin (MULTIVITAMIN) tablet, Take 1 tablet by mouth daily, Disp: , Rfl:     Probiotic Product (PROBIOTIC DAILY PO), Take by mouth, Disp: , Rfl:     Turmeric 500 MG CAPS, Take 1,000 mg by mouth daily  , Disp: , Rfl:     VITAMIN D, CHOLECALCIFEROL, PO, Take 3,000 Units by mouth daily , Disp: , Rfl:     Acetaminophen Extra Strength 500 MG tablet, Take 2 tablets by mouth every 8 (eight) hours as needed (Patient not taking: Reported on 6/1/2022), Disp: , Rfl:     halobetasol (ULTRAVATE) 0 05 % ointment, Apply topically 2 (two) times a day for 28 days, Disp: 100 g, Rfl: 0    hydrOXYzine HCL (ATARAX) 25 mg tablet, Take 0 5-1 tablets (12 5-25 mg total) by mouth every 6 (six) hours as needed for itching (Patient not taking: Reported on 6/1/2022), Disp: 90 tablet, Rfl: 0    tadalafil (CIALIS) 5 MG tablet, Take 1 tablet (5 mg total) by mouth daily as needed for erectile dysfunction, Disp: 30 tablet, Rfl: 2    OBJECTIVE:  /78 (BP Location: Left arm, Patient Position: Sitting, Cuff Size: Standard)   Pulse 92   Temp (!) 97 °F (36 1 °C)   Resp 18   Ht 5' 7" (1 702 m)   Wt 97 1 kg (214 lb)   SpO2 98%   BMI 33 52 kg/m²    BP Readings from Last 3 Encounters:   06/01/22 136/78   03/29/22 116/82   02/16/22 122/78      Wt Readings from Last 3 Encounters:   06/01/22 97 1 kg (214 lb)   03/29/22 97 5 kg (215 lb)   02/16/22 98 4 kg (217 lb)      Physical Exam  Constitutional:       General: He is not in acute distress  Appearance: Normal appearance  He is not ill-appearing or toxic-appearing  HENT:      Head: Normocephalic and atraumatic  Right Ear: External ear normal       Left Ear: External ear normal    Eyes:      Extraocular Movements: Extraocular movements intact  Conjunctiva/sclera: Conjunctivae normal    Cardiovascular:      Pulses: Normal pulses  Pulmonary:      Effort: Pulmonary effort is normal  No respiratory distress  Comments: Able to speak in complete sentences without difficulty   Musculoskeletal:      Cervical back: Normal range of motion and neck supple  No rigidity  Left lower leg: Edema present  Skin:     Findings: Rash present  No erythema  Neurological:      General: No focal deficit present  Mental Status: He is alert and oriented to person, place, and time  Psychiatric:         Mood and Affect: Mood normal          Behavior: Behavior normal                 Return if symptoms worsen or fail to improve  Sylvie Bonilla MD    Note: Portions of the record have been created with voice recognition software  Occasional wrong word or "sound a like" substitutions may have occurred due to the inherent limitations of voice recognition software  Read the chart carefully and recognize, using context, where substitutions have occurred

## 2022-06-04 ENCOUNTER — NURSE TRIAGE (OUTPATIENT)
Dept: OTHER | Facility: OTHER | Age: 67
End: 2022-06-04

## 2022-06-04 NOTE — TELEPHONE ENCOUNTER
Regarding: cellulitis  ----- Message from Akilah Amaya sent at 6/4/2022  7:38 AM EDT -----  "I was seen in the office for cellulitis and have not gotten better, I think I need a different antibiotic "

## 2022-06-04 NOTE — TELEPHONE ENCOUNTER
Patient calling in after being diagnosed with cellulitis on 6/1 and started on Keflex  He states that the cellulitis is not getting worse but it is also not getting any better after 3 days of antibiotics  Patient denies any fever at this time  Instructed him to keep taking his antibiotics as prescribed and if his symptoms worsen he should go to an UC to be seen  Otherwise told him he should have a follow up appointment scheduled for next week to assess the cellulitis and to call the office to make the appointment on Monday  Patient verbalized understanding

## 2022-06-04 NOTE — TELEPHONE ENCOUNTER
Reason for Disposition   [1] Taking antibiotic > 72 hours (3 days) AND [2] cellulitis symptoms are SAME (not getting better)    Answer Assessment - Initial Assessment Questions  1  SYMPTOM: "What's the main symptom you're concerned about?" (e g , redness, swelling, pain, fever, weakness)      Swelling and redness, and pain in the area    2  CELLULITIS LOCATION: "Where is the cellulitis located?" (e g , hand, arm, foot, leg, face)      Left lower leg above ankle     3  CELLULITIS SIZE: "What is the size of the red area?" (e g , inches, centimeters; compare to size of a coin)   Bigger than hand     4  BETTER-SAME-WORSE: "Are you getting better, staying the same, or getting worse compared to the day you started the antibiotics?"      The same     5  PAIN: Do you have any pain?"  If Yes, ask: "How bad is the pain?"  (e g , Scale 1-10; mild, moderate, or severe)     - MILD (1-3): doesn't interfere with normal activities      - MODERATE (4-7): interferes with normal activities or awakens from sleep     - SEVERE (8-10): excruciating pain, unable to do any normal activities       2/10    6  FEVER: "Do you have a fever?" If Yes, ask: "What is it, how was it measured and when did it start?"      Denies     7  OTHER SYMPTOMS: "Do you have any other symptoms?" (e g , pus coming from a wound, red streaks, weakness)     Denies     8  DIAGNOSIS DATE: "When was the cellulitis diagnosed?" "By whom?"       6/1    9  ANTIBIOTIC NAME: "What antibiotic(s) are you taking?"  "How many times per day?" (Be sure the patient is receiving the antibiotic as directed)  Keflex     10  ANTIBIOTIC DATE: "When was the antibiotic started?"       6/1     11  FOLLOW-UP APPOINTMENT: "Do you have follow-up appointment with your doctor?"       Needs follow up appt scheduled      Protocols used: CELLULITIS ON ANTIBIOTIC FOLLOW-UP CALL-[facility]

## 2022-06-06 NOTE — TELEPHONE ENCOUNTER
Spoke with patient again, gave him the message  he states he does not think it is going to get better  He said it is still painful and only slightly better with the redness  He said you told him we might need a stronger antibiotic if this one doesn't work

## 2022-06-06 NOTE — PROGRESS NOTES
FAMILY MEDICINE PROGRESS NOTE    Date of Service: 22  Primary Care Provider:   Lisseth Roca MD       Name: Ricky Kidd       : 1955       Age:66 y o  Sex: male      MRN: [id number]      Chief Complaint:Edema (cellulitis)       ASSESSMENT and PLAN:  Ricky Kidd is a 73 y o  male with:     Problem List Items Addressed This Visit    None     Visit Diagnoses     Cellulitis of left lower extremity    -  Primary    Relevant Medications    cephalexin (KEFLEX) 500 mg capsule    Tinea pedis of left foot        Relevant Medications    ketoconazole (NIZORAL) 2 % cream        Overall cellulitis is improving, though given that he still has some pain, swelling, erythema, and induration will extend antibiotics an additional 4 days  He does have tinea on dorsum of foot, this likely was nidus of cellulitis  Will treat more aggressively with ketoconazole cream as well as OTC powder to decrease moisture  SUBJECTIVE:  Ricky Kidd is a 73 y o  male who presents today with a chief complaint of Edema (cellulitis)  HPI     Patient was seen last week on  for cellulitis of left lower extremity  He was started on Keflex  He now has one pill left, but he continues to have some pain, swelling, and tenderness in his leg  Overall it is better  He does have athlete's foot for which he has been using OTC cream  He does report it is pruritic and worsens with the steroid  Recently he had some blisters on his feet, which he describes as very small fluid-filled pustules  These did resolve  Review of Systems   Cardiovascular: Positive for leg swelling  Skin: Positive for color change and rash  I have reviewed the patient's Past Medical History      Current Outpatient Medications:     cephalexin (KEFLEX) 500 mg capsule, Take 1 capsule (500 mg total) by mouth every 12 (twelve) hours for 4 days, Disp: 8 capsule, Rfl: 0    folic acid (FOLVITE) 383 mcg tablet, Take 400 mcg by mouth daily, Disp: , Rfl:     halobetasol (ULTRAVATE) 0 05 % ointment, Apply topically 2 (two) times a day for 28 days, Disp: 100 g, Rfl: 0    ibuprofen (MOTRIN) 200 mg tablet, Take 200 mg by mouth every 6 (six) hours as needed for mild pain, Disp: , Rfl:     ketoconazole (NIZORAL) 2 % cream, Apply topically daily, Disp: 30 g, Rfl: 0    mometasone (ASMANEX TWISTHALER) 220 MCG/INH inhaler, Inhale daily , Disp: , Rfl:     Multiple Vitamin (MULTIVITAMIN) tablet, Take 1 tablet by mouth daily, Disp: , Rfl:     Probiotic Product (PROBIOTIC DAILY PO), Take by mouth, Disp: , Rfl:     tadalafil (CIALIS) 5 MG tablet, Take 1 tablet (5 mg total) by mouth daily as needed for erectile dysfunction, Disp: 30 tablet, Rfl: 2    Turmeric 500 MG CAPS, Take 1,000 mg by mouth daily  , Disp: , Rfl:     VITAMIN D, CHOLECALCIFEROL, PO, Take 3,000 Units by mouth daily , Disp: , Rfl:     Acetaminophen Extra Strength 500 MG tablet, Take 2 tablets by mouth every 8 (eight) hours as needed (Patient not taking: No sig reported), Disp: , Rfl:     hydrOXYzine HCL (ATARAX) 25 mg tablet, Take 0 5-1 tablets (12 5-25 mg total) by mouth every 6 (six) hours as needed for itching (Patient not taking: Reported on 6/7/2022), Disp: 90 tablet, Rfl: 0    OBJECTIVE:  /80   Pulse 80   Temp (!) 95 °F (35 °C)   Resp 17   Ht 5' 8" (1 727 m)   Wt 97 8 kg (215 lb 8 oz)   BMI 32 77 kg/m²    BP Readings from Last 3 Encounters:   06/07/22 124/80   06/01/22 136/78   03/29/22 116/82      Wt Readings from Last 3 Encounters:   06/07/22 97 8 kg (215 lb 8 oz)   06/01/22 97 1 kg (214 lb)   03/29/22 97 5 kg (215 lb)      Physical Exam  Constitutional:       General: He is not in acute distress  Appearance: Normal appearance  He is not ill-appearing or toxic-appearing  HENT:      Head: Normocephalic and atraumatic        Right Ear: External ear normal       Left Ear: External ear normal       Nose: Nose normal       Mouth/Throat:      Mouth: Mucous membranes are moist  Eyes:      Extraocular Movements: Extraocular movements intact  Conjunctiva/sclera: Conjunctivae normal    Pulmonary:      Effort: Pulmonary effort is normal  No respiratory distress  Musculoskeletal:      Cervical back: Normal range of motion and neck supple  No rigidity  Skin:     Findings: Erythema present  No rash  Neurological:      General: No focal deficit present  Mental Status: He is alert and oriented to person, place, and time  Psychiatric:         Mood and Affect: Mood normal          Behavior: Behavior normal                 Return if symptoms worsen or fail to improve  Joaquín MD Marbin    Note: Portions of the record have been created with voice recognition software  Occasional wrong word or "sound a like" substitutions may have occurred due to the inherent limitations of voice recognition software  Read the chart carefully and recognize, using context, where substitutions have occurred

## 2022-06-06 NOTE — TELEPHONE ENCOUNTER
Spoke with patient, he stated it is slightly better, not at the rate he would expect though  He has only 3 doses of antibiotic left  He states it is still swollen however color is fading, not as red as it was

## 2022-06-06 NOTE — TELEPHONE ENCOUNTER
That sounds appropriate  The swelling will be the last thing to improve  The pain should improve first, then the redness, but it is normal to have swelling following treatment that does not mean the treatment failed so long as other symptoms are better and full constellation of symptoms do not return

## 2022-06-07 ENCOUNTER — OFFICE VISIT (OUTPATIENT)
Dept: FAMILY MEDICINE CLINIC | Facility: CLINIC | Age: 67
End: 2022-06-07
Payer: COMMERCIAL

## 2022-06-07 VITALS
HEART RATE: 80 BPM | TEMPERATURE: 95 F | RESPIRATION RATE: 17 BRPM | WEIGHT: 215.5 LBS | HEIGHT: 68 IN | DIASTOLIC BLOOD PRESSURE: 80 MMHG | BODY MASS INDEX: 32.66 KG/M2 | SYSTOLIC BLOOD PRESSURE: 124 MMHG

## 2022-06-07 DIAGNOSIS — L03.116 CELLULITIS OF LEFT LOWER EXTREMITY: Primary | ICD-10-CM

## 2022-06-07 DIAGNOSIS — B35.3 TINEA PEDIS OF LEFT FOOT: ICD-10-CM

## 2022-06-07 PROCEDURE — 1160F RVW MEDS BY RX/DR IN RCRD: CPT | Performed by: FAMILY MEDICINE

## 2022-06-07 PROCEDURE — 99214 OFFICE O/P EST MOD 30 MIN: CPT | Performed by: FAMILY MEDICINE

## 2022-06-07 PROCEDURE — 1036F TOBACCO NON-USER: CPT | Performed by: FAMILY MEDICINE

## 2022-06-07 PROCEDURE — 3008F BODY MASS INDEX DOCD: CPT | Performed by: FAMILY MEDICINE

## 2022-06-07 RX ORDER — KETOCONAZOLE 20 MG/G
CREAM TOPICAL DAILY
Qty: 30 G | Refills: 0 | Status: SHIPPED | OUTPATIENT
Start: 2022-06-07 | End: 2022-07-19

## 2022-06-07 RX ORDER — CEPHALEXIN 500 MG/1
500 CAPSULE ORAL EVERY 12 HOURS SCHEDULED
Qty: 8 CAPSULE | Refills: 0 | Status: SHIPPED | OUTPATIENT
Start: 2022-06-07 | End: 2022-06-11

## 2023-04-24 ENCOUNTER — OFFICE VISIT (OUTPATIENT)
Dept: FAMILY MEDICINE CLINIC | Facility: CLINIC | Age: 68
End: 2023-04-24

## 2023-04-24 VITALS
RESPIRATION RATE: 16 BRPM | TEMPERATURE: 98.6 F | SYSTOLIC BLOOD PRESSURE: 138 MMHG | OXYGEN SATURATION: 97 % | HEART RATE: 91 BPM | HEIGHT: 67 IN | DIASTOLIC BLOOD PRESSURE: 80 MMHG | BODY MASS INDEX: 35.16 KG/M2 | WEIGHT: 224 LBS

## 2023-04-24 DIAGNOSIS — R17 ELEVATED BILIRUBIN: ICD-10-CM

## 2023-04-24 DIAGNOSIS — R03.0 ELEVATED SYSTOLIC BLOOD PRESSURE READING WITHOUT DIAGNOSIS OF HYPERTENSION: ICD-10-CM

## 2023-04-24 DIAGNOSIS — E78.2 MIXED HYPERLIPIDEMIA: Primary | ICD-10-CM

## 2023-04-24 RX ORDER — ATORVASTATIN CALCIUM 10 MG/1
10 TABLET, FILM COATED ORAL DAILY
Qty: 30 TABLET | Refills: 5 | Status: SHIPPED | OUTPATIENT
Start: 2023-04-24

## 2023-04-24 NOTE — PROGRESS NOTES
Name: Srinivas Andujar      : 1955      MRN: [id number]  Encounter Provider: Sultana Lynch MD  Encounter Date: 2023   Encounter department: [facility]     1  Mixed hyperlipidemia  -     atorvastatin (LIPITOR) 10 mg tablet; Take 1 tablet (10 mg total) by mouth daily  -     Lipid panel; Future; Expected date: 10/24/2023    2  Elevated bilirubin  -     Comprehensive metabolic panel; Future    3  Elevated systolic blood pressure reading without diagnosis of hypertension    Patient's ASCVD risk 14 6%  We will start patient on moderate intensity statin  Repeat lipid panel in 6 months  Repeat CMP with lipid panel in 6 months  Continue monitoring blood pressure  Currently managed off medications  138/80  Counseled patient appropriate diet and exercise  Subjective      Patient presents with: Follow-up: Follow up on blood work & medications        Review of Systems   Constitutional: Negative for fatigue and fever  HENT: Negative for sore throat  Eyes: Negative for visual disturbance  Respiratory: Negative for cough, chest tightness and shortness of breath  Cardiovascular: Negative for chest pain, palpitations and leg swelling  Gastrointestinal: Negative for abdominal pain, constipation, diarrhea and nausea  Endocrine: Negative for cold intolerance and heat intolerance  Genitourinary: Negative for flank pain  Musculoskeletal: Negative for back pain and neck pain  Skin: Negative for rash  Neurological: Negative for headaches  Psychiatric/Behavioral: Negative for behavioral problems and confusion  Current Outpatient Medications on File Prior to Visit   Medication Sig   • albuterol (PROVENTIL HFA,VENTOLIN HFA) 90 mcg/act inhaler Inhale 6 7 g if needed   • betamethasone, augmented, (DIPROLENE-AF) 0 05 % cream Apply 1 application   topically if needed   • folic acid (FOLVITE) 560 mcg tablet Take 400 mcg by mouth daily   • "mometasone (ASMANEX TWISTHALER) 220 MCG/INH inhaler Inhale daily     Multiple Vitamin (MULTIVITAMIN) tablet Take 1 tablet by mouth daily    Probiotic Product (PROBIOTIC DAILY PO) Take by mouth    sildenafil (REVATIO) 20 mg tablet Take 1 tablet (20 mg total) by mouth daily as needed (Erectile dysfunction)    Turmeric 500 MG CAPS Take 1,000 mg by mouth daily      VITAMIN D, CHOLECALCIFEROL, PO Take 3,000 Units by mouth daily     halobetasol (ULTRAVATE) 0 05 % ointment Apply topically 2 (two) times a day for 28 days (Patient taking differently: Apply topically if needed)    ketoconazole (NIZORAL) 2 % cream Apply topically daily (Patient taking differently: Apply topically if needed)    Multiple Vitamin (MULTIVITAMIN) tablet Take 1 tablet by mouth daily       Objective     /80 (BP Location: Left arm, Patient Position: Sitting, Cuff Size: Large)   Pulse 91   Temp 98 6 °F (37 °C)   Resp 16   Ht 5' 7\" (1 702 m)   Wt 102 kg (224 lb)   SpO2 97%   BMI 35 08 kg/m²     Physical Exam  Vitals and nursing note reviewed  Constitutional:       Appearance: He is well-developed  HENT:      Head: Normocephalic and atraumatic  Cardiovascular:      Rate and Rhythm: Normal rate and regular rhythm  Pulmonary:      Effort: Pulmonary effort is normal       Breath sounds: Normal breath sounds  Neurological:      General: No focal deficit present  Mental Status: He is alert     Psychiatric:         Mood and Affect: Mood normal          Behavior: Behavior normal        Marcelo Vega MD  "

## 2023-07-13 DIAGNOSIS — E78.2 MIXED HYPERLIPIDEMIA: ICD-10-CM

## 2023-07-13 RX ORDER — ATORVASTATIN CALCIUM 10 MG/1
TABLET, FILM COATED ORAL
Qty: 90 TABLET | Refills: 2 | OUTPATIENT
Start: 2023-07-13

## 2023-08-12 DIAGNOSIS — E78.2 MIXED HYPERLIPIDEMIA: ICD-10-CM

## 2023-08-14 RX ORDER — ATORVASTATIN CALCIUM 10 MG/1
10 TABLET, FILM COATED ORAL DAILY
Qty: 90 TABLET | Refills: 2 | OUTPATIENT
Start: 2023-08-14

## 2023-09-15 DIAGNOSIS — E78.2 MIXED HYPERLIPIDEMIA: ICD-10-CM

## 2023-09-15 RX ORDER — ATORVASTATIN CALCIUM 10 MG/1
10 TABLET, FILM COATED ORAL DAILY
Qty: 90 TABLET | Refills: 1 | Status: SHIPPED | OUTPATIENT
Start: 2023-09-15

## 2023-11-30 DIAGNOSIS — F52.21 ERECTILE DYSFUNCTION OF NON-ORGANIC ORIGIN: ICD-10-CM

## 2023-11-30 RX ORDER — SILDENAFIL CITRATE 20 MG/1
TABLET ORAL
Qty: 10 TABLET | Refills: 5 | Status: SHIPPED | OUTPATIENT
Start: 2023-11-30

## 2024-02-16 DIAGNOSIS — F52.21 ERECTILE DYSFUNCTION OF NON-ORGANIC ORIGIN: ICD-10-CM

## 2024-02-16 RX ORDER — SILDENAFIL CITRATE 20 MG/1
TABLET ORAL
Qty: 10 TABLET | Refills: 5 | Status: SHIPPED | OUTPATIENT
Start: 2024-02-16

## 2024-03-07 DIAGNOSIS — E78.2 MIXED HYPERLIPIDEMIA: ICD-10-CM

## 2024-03-07 RX ORDER — ATORVASTATIN CALCIUM 10 MG/1
10 TABLET, FILM COATED ORAL DAILY
Qty: 90 TABLET | Refills: 1 | Status: SHIPPED | OUTPATIENT
Start: 2024-03-07

## 2024-04-24 ENCOUNTER — APPOINTMENT (OUTPATIENT)
Dept: LAB | Facility: CLINIC | Age: 69
End: 2024-04-24
Payer: COMMERCIAL

## 2024-04-24 DIAGNOSIS — R17 ELEVATED BILIRUBIN: ICD-10-CM

## 2024-04-24 DIAGNOSIS — E78.2 MIXED HYPERLIPIDEMIA: ICD-10-CM

## 2024-04-24 LAB
ALBUMIN SERPL BCP-MCNC: 4.4 G/DL (ref 3.5–5)
ALP SERPL-CCNC: 46 U/L (ref 34–104)
ALT SERPL W P-5'-P-CCNC: 32 U/L (ref 7–52)
ANION GAP SERPL CALCULATED.3IONS-SCNC: 9 MMOL/L (ref 4–13)
AST SERPL W P-5'-P-CCNC: 22 U/L (ref 13–39)
BILIRUB SERPL-MCNC: 0.89 MG/DL (ref 0.2–1)
BUN SERPL-MCNC: 12 MG/DL (ref 5–25)
CALCIUM SERPL-MCNC: 8.9 MG/DL (ref 8.4–10.2)
CHLORIDE SERPL-SCNC: 107 MMOL/L (ref 96–108)
CHOLEST SERPL-MCNC: 135 MG/DL
CO2 SERPL-SCNC: 28 MMOL/L (ref 21–32)
CREAT SERPL-MCNC: 0.92 MG/DL (ref 0.6–1.3)
GFR SERPL CREATININE-BSD FRML MDRD: 85 ML/MIN/1.73SQ M
GLUCOSE P FAST SERPL-MCNC: 89 MG/DL (ref 65–99)
HDLC SERPL-MCNC: 66 MG/DL
LDLC SERPL CALC-MCNC: 57 MG/DL (ref 0–100)
NONHDLC SERPL-MCNC: 69 MG/DL
POTASSIUM SERPL-SCNC: 4.1 MMOL/L (ref 3.5–5.3)
PROT SERPL-MCNC: 6.9 G/DL (ref 6.4–8.4)
SODIUM SERPL-SCNC: 144 MMOL/L (ref 135–147)
TRIGL SERPL-MCNC: 59 MG/DL

## 2024-04-24 PROCEDURE — 80053 COMPREHEN METABOLIC PANEL: CPT

## 2024-04-24 PROCEDURE — 80061 LIPID PANEL: CPT

## 2024-04-24 PROCEDURE — 36415 COLL VENOUS BLD VENIPUNCTURE: CPT

## 2024-05-06 ENCOUNTER — OFFICE VISIT (OUTPATIENT)
Dept: FAMILY MEDICINE CLINIC | Facility: CLINIC | Age: 69
End: 2024-05-06
Payer: COMMERCIAL

## 2024-05-06 VITALS
SYSTOLIC BLOOD PRESSURE: 138 MMHG | HEART RATE: 93 BPM | BODY MASS INDEX: 35.16 KG/M2 | OXYGEN SATURATION: 98 % | WEIGHT: 224 LBS | RESPIRATION RATE: 18 BRPM | TEMPERATURE: 98.6 F | HEIGHT: 67 IN | DIASTOLIC BLOOD PRESSURE: 78 MMHG

## 2024-05-06 DIAGNOSIS — N13.8 BPH WITH OBSTRUCTION/LOWER URINARY TRACT SYMPTOMS: ICD-10-CM

## 2024-05-06 DIAGNOSIS — Z12.5 SCREENING PSA (PROSTATE SPECIFIC ANTIGEN): ICD-10-CM

## 2024-05-06 DIAGNOSIS — E78.2 MIXED HYPERLIPIDEMIA: ICD-10-CM

## 2024-05-06 DIAGNOSIS — Z12.12 SCREENING FOR COLORECTAL CANCER: ICD-10-CM

## 2024-05-06 DIAGNOSIS — Z00.00 ANNUAL PHYSICAL EXAM: Primary | ICD-10-CM

## 2024-05-06 DIAGNOSIS — Z78.9 ALCOHOL USE: ICD-10-CM

## 2024-05-06 DIAGNOSIS — E66.9 OBESITY (BMI 30.0-34.9): ICD-10-CM

## 2024-05-06 DIAGNOSIS — N40.1 BPH WITH OBSTRUCTION/LOWER URINARY TRACT SYMPTOMS: ICD-10-CM

## 2024-05-06 DIAGNOSIS — M17.0 PRIMARY OSTEOARTHRITIS OF BOTH KNEES: ICD-10-CM

## 2024-05-06 DIAGNOSIS — K21.9 GASTROESOPHAGEAL REFLUX DISEASE WITHOUT ESOPHAGITIS: ICD-10-CM

## 2024-05-06 DIAGNOSIS — Z12.11 SCREENING FOR COLORECTAL CANCER: ICD-10-CM

## 2024-05-06 PROBLEM — F10.90 ALCOHOL USE: Status: ACTIVE | Noted: 2024-05-06

## 2024-05-06 PROCEDURE — 99397 PER PM REEVAL EST PAT 65+ YR: CPT | Performed by: FAMILY MEDICINE

## 2024-05-06 NOTE — PROGRESS NOTES
ADULT ANNUAL PHYSICAL  [facility]    NAME: Gonzalo Montana  AGE: 68 y.o. SEX: male  : 1955     DATE: 2024     Assessment and Plan:     Problem List Items Addressed This Visit        Digestive    Gastroesophageal reflux disease without esophagitis       Musculoskeletal and Integument    Primary osteoarthritis of both knees    Relevant Orders    XR knee 3 vw left non injury    XR knee 3 vw right non injury       Genitourinary    BPH with obstruction/lower urinary tract symptoms       Behavioral Health    Alcohol use     Makes his own wine. Drinks 2-3 glasses per day. Not interested in stopping.             Other    Obesity (BMI 30.0-34.9)    Relevant Orders    Comprehensive metabolic panel    CBC    Mixed hyperlipidemia    Relevant Orders    Lipid panel   Other Visit Diagnoses     Annual physical exam    -  Primary    Screening PSA (prostate specific antigen)        Relevant Orders    PSA, Total Screen    Screening for colorectal cancer        Relevant Orders    Ambulatory referral to Gastroenterology        Annual physical completed today   Stable on medications   Labs stable   Orders above    Immunizations and preventive care screenings were discussed with patient today. Appropriate education was printed on patient's after visit summary.    Discussed risks and benefits of prostate cancer screening. We discussed the controversial history of PSA screening for prostate cancer in the [place] as well as the risk of over detection and over treatment of prostate cancer by way of PSA screening.  The patient understands that PSA blood testing is an imperfect way to screen for prostate cancer and that elevated PSA levels in the blood may also be caused by infection, inflammation, prostatic trauma or manipulation, urological procedures, or by benign prostatic enlargement.    The role of the digital rectal examination in prostate cancer screening was also  discussed and I discussed with him that there is large interobserver variability in the findings of digital rectal examination.    Counseling:  Alcohol/drug use: discussed moderation in alcohol intake, the recommendations for healthy alcohol use, and avoidance of illicit drug use.  Dental Health: discussed importance of regular tooth brushing, flossing, and dental visits.  Injury prevention: discussed safety/seat belts, safety helmets, smoke detectors, carbon dioxide detectors, and smoking near bedding or upholstery.  Sexual health: discussed sexually transmitted diseases, partner selection, use of condoms, avoidance of unintended pregnancy, and contraceptive alternatives.  Exercise: the importance of regular exercise/physical activity was discussed. Recommend exercise 3-5 times per week for at least 30 minutes.       Depression Screening and Follow-up Plan: Patient was screened for depression during today's encounter. They screened negative with a PHQ-2 score of 0.        Return in about 6 months (around 11/6/2024) for 6m follow-up 30 minute visit.     Chief Complaint:     Chief Complaint   Patient presents with   • Annual Exam      History of Present Illness:     Adult Annual Physical   Patient here for a comprehensive physical exam. The patient reports no problems.    Diet and Physical Activity  Diet/Nutrition: well balanced diet, limited junk food, and limited fruits/vegetables.   Exercise: walking.      Depression Screening  PHQ-2/9 Depression Screening    Little interest or pleasure in doing things: 0 - not at all  Feeling down, depressed, or hopeless: 0 - not at all  PHQ-2 Score: 0  PHQ-2 Interpretation: Negative depression screen       General Health  Sleep: sleeps well and gets 7-8 hours of sleep on average.   Hearing: normal - bilateral.  Vision: no vision problems.   Dental: regular dental visits.        Health  Symptoms include: urinary frequency and nocturia       Review of Systems:     Review of  Systems   Constitutional:  Negative for activity change, fatigue and fever.   Eyes:  Negative for visual disturbance.   Respiratory:  Negative for shortness of breath.    Cardiovascular:  Negative for chest pain.   Gastrointestinal:  Negative for abdominal pain, constipation, diarrhea and nausea.   Endocrine: Negative for cold intolerance and heat intolerance.   Musculoskeletal:  Negative for back pain.        Bilateral knee pain worse with activity      Skin:  Negative for rash.   Neurological:  Negative for headaches.   Psychiatric/Behavioral:  Negative for confusion.       Past Medical History:     Past Medical History:   Diagnosis Date   • Asthma    • Crushing injury of hand and fingers 10/22/2012    Crush injury of fingers of right hand   • Erectile dysfunction of non-organic origin    • GERD (gastroesophageal reflux disease)    • Shoulder joint pain 01/30/2013    Unspecified laterality   • Umbilical hernia       Past Surgical History:     Past Surgical History:   Procedure Laterality Date   • CATARACT EXTRACTION, BILATERAL     • FOOT SURGERY     • HERNIA REPAIR     • KNEE SURGERY     • [place] SURGICAL ARTHROSCOPY SHOULDER REPAIR SLAP LESION Left 5/2/2017    Procedure: ARTHROSCOPY SHOULDER W/ bicep tendodesis, SAD ;  Surgeon: Vinh Blair MD;  Location:  MAIN OR;  Service: Orthopedics      Family History:     Family History   Problem Relation Age of Onset   • No Known Problems Mother    • No Known Problems Father    • No Known Problems Family       Social History:     Social History     Socioeconomic History   • Marital status:      Spouse name: None   • Number of children: None   • Years of education: None   • Highest education level: None   Occupational History   • None   Tobacco Use   • Smoking status: Never   • Smokeless tobacco: Never   Vaping Use   • Vaping status: Never Used   Substance and Sexual Activity   • Alcohol use: Yes     Alcohol/week: 14.0 standard drinks of alcohol     Types: 14  Glasses of wine per week     Comment: 'couple glasses' wine daily    • Drug use: No   • Sexual activity: Yes     Partners: Female     Birth control/protection: Male Sterilization, None   Other Topics Concern   • None   Social History Narrative    Drinks coffee     Social Determinants of Health     Financial Resource Strain: Low Risk  (4/10/2023)    Overall Financial Resource Strain (CARDIA)    • Difficulty of Paying Living Expenses: Not very hard   Food Insecurity: Not on file   Transportation Needs: No Transportation Needs (4/10/2023)    PRAPARE - Transportation    • Lack of Transportation (Medical): No    • Lack of Transportation (Non-Medical): No   Physical Activity: Not on file   Stress: Not on file   Social Connections: Not on file   Intimate Partner Violence: Not on file   Housing Stability: Not on file      Current Medications:     Current Outpatient Medications   Medication Sig Dispense Refill   • albuterol (PROVENTIL HFA,VENTOLIN HFA) 90 mcg/act inhaler Inhale 6.7 g if needed     • atorvastatin (LIPITOR) 10 mg tablet TAKE 1 TABLET BY MOUTH EVERY DAY 90 tablet 1   • betamethasone, augmented, (DIPROLENE-AF) 0.05 % cream Apply 1 application. topically if needed     • folic acid (FOLVITE) 400 mcg tablet Take 400 mcg by mouth daily     • halobetasol (ULTRAVATE) 0.05 % ointment Apply topically 2 (two) times a day for 28 days (Patient taking differently: Apply topically if needed) 100 g 0   • mometasone (ASMANEX TWISTHALER) 220 MCG/INH inhaler Inhale daily      • Multiple Vitamin (MULTIVITAMIN) tablet Take 1 tablet by mouth daily     • Turmeric 500 MG CAPS Take 1,000 mg by mouth daily       • VITAMIN D, CHOLECALCIFEROL, PO Take 3,000 Units by mouth daily      • ketoconazole (NIZORAL) 2 % cream Apply topically daily (Patient taking differently: Apply topically if needed) 30 g 0   • sildenafil (REVATIO) 20 mg tablet take 1 tablet by mouth daily if needed for ERECTILE DYSFUNCTION (Patient not taking: Reported on  "5/6/2024) 10 tablet 5     No current facility-administered medications for this visit.      Allergies:     Allergies   Allergen Reactions   • Other Allergic Rhinitis     Animal dander      Physical Exam:     /78 (BP Location: Left arm, Patient Position: Sitting, Cuff Size: Large)   Pulse 93   Temp 98.6 °F (37 °C) (Temporal)   Resp 18   Ht 5' 7\" (1.702 m)   Wt 102 kg (224 lb)   SpO2 98%   BMI 35.08 kg/m²     Physical Exam  Vitals and nursing note reviewed.   Constitutional:       Appearance: Normal appearance. He is well-developed.   HENT:      Head: Normocephalic and atraumatic.   Eyes:      Extraocular Movements: Extraocular movements intact.      Pupils: Pupils are equal, round, and reactive to light.   Cardiovascular:      Rate and Rhythm: Normal rate and regular rhythm.   Pulmonary:      Effort: Pulmonary effort is normal.      Breath sounds: Normal breath sounds.   Abdominal:      General: Bowel sounds are normal.      Palpations: Abdomen is soft.   Musculoskeletal:      Cervical back: Normal range of motion.   Skin:     General: Skin is warm and dry.   Neurological:      General: No focal deficit present.      Mental Status: He is alert and oriented to person, place, and time.   Psychiatric:         Mood and Affect: Mood normal.         Speech: Speech normal.         Behavior: Behavior normal.          Jan Liriano MD  [facility]    "

## 2024-05-10 ENCOUNTER — HOSPITAL ENCOUNTER (OUTPATIENT)
Dept: RADIOLOGY | Facility: HOSPITAL | Age: 69
Discharge: HOME | End: 2024-05-10
Payer: COMMERCIAL

## 2024-05-10 DIAGNOSIS — M17.0 PRIMARY OSTEOARTHRITIS OF BOTH KNEES: ICD-10-CM

## 2024-05-10 PROCEDURE — 73562 X-RAY EXAM OF KNEE 3: CPT

## 2024-07-18 ENCOUNTER — OFFICE VISIT (OUTPATIENT)
Dept: FAMILY MEDICINE CLINIC | Facility: CLINIC | Age: 69
End: 2024-07-18
Payer: COMMERCIAL

## 2024-07-18 ENCOUNTER — NURSE TRIAGE (OUTPATIENT)
Dept: OTHER | Facility: OTHER | Age: 69
End: 2024-07-18

## 2024-07-18 VITALS
HEIGHT: 67 IN | SYSTOLIC BLOOD PRESSURE: 134 MMHG | HEART RATE: 83 BPM | OXYGEN SATURATION: 98 % | RESPIRATION RATE: 18 BRPM | WEIGHT: 220 LBS | BODY MASS INDEX: 34.53 KG/M2 | DIASTOLIC BLOOD PRESSURE: 86 MMHG | TEMPERATURE: 98.1 F

## 2024-07-18 DIAGNOSIS — U07.1 COVID-19: ICD-10-CM

## 2024-07-18 DIAGNOSIS — J45.30 MILD PERSISTENT ASTHMA IN ADULT WITHOUT COMPLICATION: Primary | ICD-10-CM

## 2024-07-18 PROCEDURE — 99214 OFFICE O/P EST MOD 30 MIN: CPT | Performed by: FAMILY MEDICINE

## 2024-07-18 RX ORDER — PREDNISONE 20 MG/1
40 TABLET ORAL DAILY
Qty: 10 TABLET | Refills: 0 | Status: SHIPPED | OUTPATIENT
Start: 2024-07-18 | End: 2024-07-23

## 2024-07-18 NOTE — PROGRESS NOTES
Ambulatory Visit  Name: Gonzalo Montana      : 1955      MRN: [id number]  Encounter Provider: Jan Liriano MD  Encounter Date: 2024   Encounter department: [facility]    Assessment & Plan   1. Mild persistent asthma in adult without complication  2. COVID-19  -     predniSONE 20 mg tablet; Take 2 tablets (40 mg total) by mouth daily for 5 days       COVID-positive in office.  Patient thought he was having an asthma exacerbation.  Does have some mild wheezing on the right side we will prescribe him with 40 mg of prednisone.  He is out of Paxlovid window.  Continue symptomatic treatment.  ER precautions discussed    History of Present Illness     Patient presents with:  Asthma: States he would usually go to his allergist but no one would take him and they would usually give him perdoinoson     Congested sneezing coughing since last Wednesday.  He was outside working on his barn.  Patient wants prednisone for wheezing.  Was working on his barn.  Denies any significant shortness of breath at this time.  Believes he is having an asthma exacerbation.    Asthma  He complains of cough, shortness of breath and wheezing. Associated symptoms include headaches, rhinorrhea and a sore throat. His past medical history is significant for asthma.     Review of Systems   Constitutional:  Positive for fatigue.   HENT:  Positive for congestion, rhinorrhea and sore throat.    Respiratory:  Positive for cough, shortness of breath and wheezing.    Neurological:  Positive for headaches.     Past Medical History:   Diagnosis Date    Asthma     Crushing injury of hand and fingers 10/22/2012    Crush injury of fingers of right hand    Erectile dysfunction of non-organic origin     GERD (gastroesophageal reflux disease)     Shoulder joint pain 2013    Unspecified laterality    Umbilical hernia      Past Surgical History:   Procedure Laterality Date    CATARACT EXTRACTION, BILATERAL      FOOT  SURGERY      HERNIA REPAIR      KNEE SURGERY      [place] SURGICAL ARTHROSCOPY SHOULDER REPAIR SLAP LESION Left 5/2/2017    Procedure: ARTHROSCOPY SHOULDER W/ bicep tendodesis, SAD ;  Surgeon: Vinh Blair MD;  Location: [facility];  Service: Orthopedics     Family History   Problem Relation Age of Onset    No Known Problems Mother     No Known Problems Father     No Known Problems Family      Social History     Tobacco Use    Smoking status: Never    Smokeless tobacco: Never   Vaping Use    Vaping status: Never Used   Substance and Sexual Activity    Alcohol use: Yes     Alcohol/week: 14.0 standard drinks of alcohol     Types: 14 Glasses of wine per week     Comment: 'couple glasses' wine daily     Drug use: No    Sexual activity: Yes     Partners: Female     Birth control/protection: Male Sterilization, None     Current Outpatient Medications on File Prior to Visit   Medication Sig    atorvastatin (LIPITOR) 10 mg tablet TAKE 1 TABLET BY MOUTH EVERY DAY    betamethasone, augmented, (DIPROLENE-AF) 0.05 % cream Apply 1 application. topically if needed    folic acid (FOLVITE) 400 mcg tablet Take 400 mcg by mouth daily    halobetasol (ULTRAVATE) 0.05 % ointment Apply topically 2 (two) times a day for 28 days (Patient taking differently: Apply topically if needed)    ketoconazole (NIZORAL) 2 % cream Apply topically daily (Patient taking differently: Apply topically if needed)    mometasone (ASMANEX TWISTHALER) 220 MCG/INH inhaler Inhale daily     Multiple Vitamin (MULTIVITAMIN) tablet Take 1 tablet by mouth daily    Turmeric 500 MG CAPS Take 1,000 mg by mouth daily      VITAMIN D, CHOLECALCIFEROL, PO Take 3,000 Units by mouth daily     albuterol (PROVENTIL HFA,VENTOLIN HFA) 90 mcg/act inhaler Inhale 6.7 g if needed (Patient not taking: Reported on 7/18/2024)    sildenafil (REVATIO) 20 mg tablet take 1 tablet by mouth daily if needed for ERECTILE DYSFUNCTION (Patient not taking: Reported on 5/6/2024)     Allergies  "  Allergen Reactions    Other Allergic Rhinitis     Animal dander     Immunization History   Administered Date(s) Administered    Tdap 10/19/2012    Zoster 08/13/2013     Objective     /86 (BP Location: Left arm, Patient Position: Sitting, Cuff Size: Large)   Pulse 83   Temp 98.1 °F (36.7 °C) (Temporal)   Resp 18   Ht 5' 7\" (1.702 m)   Wt 99.8 kg (220 lb)   SpO2 98%   BMI 34.46 kg/m²     Physical Exam  Vitals and nursing note reviewed.   Constitutional:       Appearance: Normal appearance. He is well-developed.   HENT:      Head: Normocephalic and atraumatic.      Nose: Congestion present.   Eyes:      Extraocular Movements: Extraocular movements intact.      Pupils: Pupils are equal, round, and reactive to light.   Cardiovascular:      Rate and Rhythm: Normal rate and regular rhythm.   Pulmonary:      Effort: No respiratory distress.      Breath sounds: Wheezing present.   Abdominal:      General: Bowel sounds are normal.      Palpations: Abdomen is soft.   Musculoskeletal:      Cervical back: Normal range of motion.   Skin:     General: Skin is warm and dry.   Neurological:      General: No focal deficit present.      Mental Status: He is alert and oriented to person, place, and time.   Psychiatric:         Mood and Affect: Mood normal.         Speech: Speech normal.         Behavior: Behavior normal.       Administrative Statements   I have spent a total time of 30 minutes in caring for this patient on the day of the visit/encounter including Diagnostic results, Prognosis, Risks and benefits of tx options, Instructions for management, Patient and family education, Importance of tx compliance, Risk factor reductions, Impressions, Documenting in the medical record, Reviewing / ordering tests, medicine, procedures  , and Obtaining or reviewing history  .  "

## 2024-07-18 NOTE — TELEPHONE ENCOUNTER
"Reason for Disposition  • MILD asthma attack (e.g., no SOB at rest, mild SOB with walking, speaks normally in sentences, mild wheezing) and persists > 24 hours on appropriate treatment    Answer Assessment - Initial Assessment Questions  1. RESPIRATORY STATUS: \"Describe your breathing?\" (e.g., wheezing, shortness of breath, unable to speak, severe coughing)       Wheezing, runny nose, mild SOB.   2. ONSET: \"When did this asthma attack begin?\"       2-3 days ago.   3. TRIGGER: \"What do you think triggered this attack?\" (e.g., URI, exposure to pollen or other allergen, tobacco smoke)       Possible allergy   5. SEVERITY: \"How bad is this attack?\"     - MILD: No SOB at rest, mild SOB with walking, speaks normally in sentences, can lay down, no retractions, pulse < 100. (GREEN Zone: PEFR %)    - MODERATE: SOB at rest, SOB with minimal exertion and prefers to sit, cannot lie down flat, speaks in phrases, mild retractions, audible wheezing, pulse 100-120. (YELLOW Zone: PEFR 50-80%)     - SEVERE: Very SOB at rest, speaks in single words, struggling to breathe, sitting hunched forward, retractions, usually loud wheezing, sometimes minimal wheezing because of decreased air movement, pulse > 120. (RED Zone: PEFR < 50%).       Mild   6. MEDICATIONS (Inhaler or nebs): \"What are your asthma medications?\" and \"What treatments have you given so far?\"     - Quick-relief: albuterol, metaproterenol, salbutamol, or other inhaled or nebulized beta-agonist medicines    - Long-term-control: steroids, cromolyn, or other anti-inflammatory medicines.      None   7. OTHER SYMPTOMS: \"Do you have any other symptoms? (e.g., runny nose, chest pain, fever)      Runny nose    Protocols used: Asthma Attack-ADULT-[place]    "

## 2024-07-18 NOTE — TELEPHONE ENCOUNTER
Triage RN scheduled an appointment as short notice for 1015, patient confirmed that he would be at the office. Per clerical department, the appointment should be cancelled because of the short notice. Triage RN was unable to reach to the patient to cancel the appointment, message was left for the patient to go to [facility] [place].

## 2024-08-31 DIAGNOSIS — E78.2 MIXED HYPERLIPIDEMIA: ICD-10-CM

## 2024-09-01 RX ORDER — ATORVASTATIN CALCIUM 10 MG/1
10 TABLET, FILM COATED ORAL DAILY
Qty: 90 TABLET | Refills: 1 | Status: SHIPPED | OUTPATIENT
Start: 2024-09-01

## 2024-11-06 ENCOUNTER — OFFICE VISIT (OUTPATIENT)
Dept: FAMILY MEDICINE CLINIC | Facility: CLINIC | Age: 69
End: 2024-11-06
Payer: COMMERCIAL

## 2024-11-06 VITALS
WEIGHT: 226.5 LBS | HEIGHT: 67 IN | DIASTOLIC BLOOD PRESSURE: 82 MMHG | BODY MASS INDEX: 35.55 KG/M2 | TEMPERATURE: 97.8 F | SYSTOLIC BLOOD PRESSURE: 130 MMHG | OXYGEN SATURATION: 95 % | RESPIRATION RATE: 18 BRPM | HEART RATE: 101 BPM

## 2024-11-06 DIAGNOSIS — J45.30 MILD PERSISTENT ASTHMA IN ADULT WITHOUT COMPLICATION: ICD-10-CM

## 2024-11-06 DIAGNOSIS — L28.0 LICHEN SIMPLEX CHRONICUS: ICD-10-CM

## 2024-11-06 DIAGNOSIS — E66.811 OBESITY (BMI 30.0-34.9): ICD-10-CM

## 2024-11-06 DIAGNOSIS — Z12.11 SCREENING FOR COLON CANCER: ICD-10-CM

## 2024-11-06 DIAGNOSIS — J45.31 MILD PERSISTENT ASTHMA WITH EXACERBATION: ICD-10-CM

## 2024-11-06 DIAGNOSIS — E78.2 MIXED HYPERLIPIDEMIA: Primary | ICD-10-CM

## 2024-11-06 PROBLEM — M17.0 PRIMARY OSTEOARTHRITIS OF BOTH KNEES: Status: RESOLVED | Noted: 2024-05-06 | Resolved: 2024-11-06

## 2024-11-06 PROBLEM — M19.011 OSTEOARTHRITIS OF RIGHT GLENOHUMERAL JOINT: Status: RESOLVED | Noted: 2020-02-13 | Resolved: 2024-11-06

## 2024-11-06 PROBLEM — M75.41 SUBACROMIAL IMPINGEMENT OF RIGHT SHOULDER: Status: RESOLVED | Noted: 2019-10-21 | Resolved: 2024-11-06

## 2024-11-06 PROBLEM — M75.20 TENDINITIS OF LONG HEAD OF BICEPS: Status: RESOLVED | Noted: 2020-02-13 | Resolved: 2024-11-06

## 2024-11-06 PROCEDURE — 99214 OFFICE O/P EST MOD 30 MIN: CPT | Performed by: FAMILY MEDICINE

## 2024-11-06 RX ORDER — PREDNISONE 20 MG/1
40 TABLET ORAL DAILY
Qty: 10 TABLET | Refills: 0 | Status: SHIPPED | OUTPATIENT
Start: 2024-11-06 | End: 2024-11-11

## 2024-11-06 RX ORDER — HALOBETASOL PROPIONATE 0.05 %
OINTMENT (GRAM) TOPICAL 2 TIMES DAILY
Qty: 100 G | Refills: 0 | Status: SHIPPED | OUTPATIENT
Start: 2024-11-06 | End: 2024-12-04

## 2024-11-06 NOTE — ASSESSMENT & PLAN NOTE
Continue halobetasol twice daily.  Orders:    halobetasol (ULTRAVATE) 0.05 % ointment; Apply topically 2 (two) times a day for 28 days

## 2024-11-06 NOTE — PROGRESS NOTES
Ambulatory Visit  Name: Gonzalo Montana      : 1955      MRN: [id number]  Encounter Provider: Jan Liriano MD  Encounter Date: 2024   Encounter department: [facility]    Assessment & Plan  Mixed hyperlipidemia  Stable continue atorvastatin          Mild persistent asthma in adult without complication  Follows with Allergist  Controlled on daily Asmanex inhaler          Obesity (BMI 30.0-34.9)           Screening for colon cancer    Orders:    Ambulatory Referral to Gastroenterology; Future    Lichen simplex chronicus  Continue halobetasol twice daily.  Orders:    halobetasol (ULTRAVATE) 0.05 % ointment; Apply topically 2 (two) times a day for 28 days    Mild persistent asthma with exacerbation  Wheezing on examination consistent with asthma exacerbation.  Start patient on pulse dose steroids.  Continue inhalers.  Schedule follow-up with allergist may need adjustment of medications.  Orders:    predniSONE 20 mg tablet; Take 2 tablets (40 mg total) by mouth daily for 5 days    Follow-up completed today.  Lab work in chart for annual physical  Orders listed above  Continue following with allergist.  Bring updated inhaler to next visit.     History of Present Illness     Patient presents with:  Follow-up: 6 mo    Doing well. Following with allergist. Asthma has been flaring recently.  Does not have an upcoming appointment with allergist.  He has been seeing this allergist for over 20 years.  Typically stable on his current medications.  Due for colonoscopy.          Review of Systems   Constitutional:  Negative for activity change, fatigue and fever.   Eyes:  Negative for visual disturbance.   Respiratory:  Positive for shortness of breath and wheezing.    Cardiovascular:  Negative for chest pain.   Gastrointestinal:  Negative for abdominal pain, constipation, diarrhea and nausea.   Endocrine: Negative for cold intolerance and heat intolerance.   Musculoskeletal:  Negative for  "back pain.   Skin:  Negative for rash.   Neurological:  Negative for headaches.   Psychiatric/Behavioral:  Negative for confusion.            Objective     /82 (BP Location: Left arm, Patient Position: Sitting, Cuff Size: Standard)   Pulse 101   Temp 97.8 °F (36.6 °C) (Temporal)   Resp 18   Ht 5' 7\" (1.702 m)   Wt 103 kg (226 lb 8 oz)   SpO2 95%   BMI 35.47 kg/m²     Physical Exam  Vitals and nursing note reviewed.   Constitutional:       Appearance: Normal appearance. He is well-developed.   HENT:      Head: Normocephalic and atraumatic.   Cardiovascular:      Rate and Rhythm: Normal rate and regular rhythm.   Pulmonary:      Effort: Pulmonary effort is normal.      Breath sounds: Wheezing present.   Abdominal:      General: Bowel sounds are normal.      Palpations: Abdomen is soft.   Musculoskeletal:      Cervical back: Normal range of motion.   Skin:     General: Skin is warm.   Neurological:      General: No focal deficit present.      Mental Status: He is alert.   Psychiatric:         Mood and Affect: Mood normal.         Speech: Speech normal.         "

## 2025-03-01 DIAGNOSIS — E78.2 MIXED HYPERLIPIDEMIA: ICD-10-CM

## 2025-03-01 RX ORDER — ATORVASTATIN CALCIUM 10 MG/1
10 TABLET, FILM COATED ORAL DAILY
Qty: 90 TABLET | Refills: 1 | Status: SHIPPED | OUTPATIENT
Start: 2025-03-01

## 2025-03-10 ENCOUNTER — PREP FOR PROCEDURE (OUTPATIENT)
Dept: GASTROENTEROLOGY | Facility: CLINIC | Age: 70
End: 2025-03-10

## 2025-03-10 ENCOUNTER — TELEPHONE (OUTPATIENT)
Dept: GASTROENTEROLOGY | Facility: CLINIC | Age: 70
End: 2025-03-10

## 2025-03-10 DIAGNOSIS — Z12.11 SCREENING FOR COLON CANCER: Primary | ICD-10-CM

## 2025-03-10 NOTE — TELEPHONE ENCOUNTER
Scheduled date of colonoscopy (as of today): 04/28/2025  Physician performing colonoscopy: Dr. Desir   Location of colonoscopy: [facility]  Bowel prep reviewed with patient: Miralax   Instructions reviewed with patient by: Karine mailed   Clearances: N/A

## 2025-03-10 NOTE — TELEPHONE ENCOUNTER
03/10/25  Screened by: Karine Cameron    Referring Provider Dr. Liriano     Pre- Screening:     There is no height or weight on file to calculate BMI.  Has patient been referred for a routine screening Colonoscopy? yes  Is the patient between 45-75 years old? yes      Previous Colonoscopy yes   If yes:    Date: 00/00/00    Facility: Unknown    Reason: Unknown        SCHEDULING STAFF: If the patient is between 45yrs-49yrs, please advise patient to confirm benefits/coverage with their insurance company for a routine screening colonoscopy, some insurance carriers will only cover at 50yrs or older. If the patient is over 75years old, please schedule an office visit.     Does the patient want to see a Gastroenterologist prior to their procedure OR are they having any GI symptoms? no    Has the patient been hospitalized or had abdominal surgery in the past 6 months? no    Does the patient use supplemental oxygen? no    Does the patient take Coumadin, Lovenox, Plavix, Elliquis, Xarelto, or other blood thinning medication? no    Has the patient had a stroke, cardiac event, or stent placed in the past year? no    SCHEDULING STAFF: If patient answers NO to above questions, then schedule procedure. If patient answers YES to above questions, then schedule office appointment.     If patient is between 45yrs - 49yrs, please advise patient that we will have to confirm benefits & coverage with their insurance company for a routine screening colonoscopy.

## 2025-04-14 ENCOUNTER — ANESTHESIA EVENT (OUTPATIENT)
Dept: ANESTHESIOLOGY | Facility: HOSPITAL | Age: 70
End: 2025-04-14

## 2025-04-14 ENCOUNTER — ANESTHESIA (OUTPATIENT)
Dept: ANESTHESIOLOGY | Facility: HOSPITAL | Age: 70
End: 2025-04-14

## 2025-05-14 ENCOUNTER — OFFICE VISIT (OUTPATIENT)
Dept: FAMILY MEDICINE CLINIC | Facility: CLINIC | Age: 70
End: 2025-05-14
Payer: COMMERCIAL

## 2025-05-14 VITALS
HEIGHT: 67 IN | BODY MASS INDEX: 35.47 KG/M2 | OXYGEN SATURATION: 95 % | HEART RATE: 78 BPM | DIASTOLIC BLOOD PRESSURE: 82 MMHG | RESPIRATION RATE: 18 BRPM | WEIGHT: 226 LBS | TEMPERATURE: 97.7 F | SYSTOLIC BLOOD PRESSURE: 132 MMHG

## 2025-05-14 DIAGNOSIS — J45.31 MILD PERSISTENT ASTHMA WITH EXACERBATION: ICD-10-CM

## 2025-05-14 DIAGNOSIS — Z12.5 SCREENING FOR PROSTATE CANCER: ICD-10-CM

## 2025-05-14 DIAGNOSIS — M19.042 ARTHRITIS OF BOTH HANDS: ICD-10-CM

## 2025-05-14 DIAGNOSIS — Z00.00 ANNUAL PHYSICAL EXAM: Primary | ICD-10-CM

## 2025-05-14 DIAGNOSIS — E66.811 OBESITY (BMI 30.0-34.9): ICD-10-CM

## 2025-05-14 DIAGNOSIS — M19.041 ARTHRITIS OF BOTH HANDS: ICD-10-CM

## 2025-05-14 DIAGNOSIS — E78.2 MIXED HYPERLIPIDEMIA: ICD-10-CM

## 2025-05-14 PROCEDURE — 99397 PER PM REEVAL EST PAT 65+ YR: CPT | Performed by: FAMILY MEDICINE

## 2025-05-14 NOTE — PROGRESS NOTES
Adult Annual Physical  Name: Gonzalo Montana      : 1955      MRN: [id number]  Encounter Provider: Jan Liriano MD  Encounter Date: 2025   Encounter department: [facility]    :  Assessment & Plan  Annual physical exam         Arthritis of both hands  Possible RA.  Pain worse in the morning or in activity.  Starting to develop deformity in the left hand.  Will obtain RF and anti-CCP.  Orders:    Diclofenac Sodium (VOLTAREN) 1 %; Apply 2 g topically 4 (four) times a day    RHEUMATOID FACTOR; Future    Cyclic citrul peptide antibody, IgG; Future    Obesity (BMI 30.0-34.9)      Orders:    Comprehensive metabolic panel; Future    CBC; Future    Mixed hyperlipidemia    Orders:    Lipid panel; Future    Screening for prostate cancer    Orders:    PSA, Total Screen; Future    Mild persistent asthma with exacerbation         Due for colonoscopy        Immunizations:  - Immunizations due: Prevnar 20, Tdap and Zoster (Shingrix)      Depression Screening and Follow-up Plan: Patient was screened for depression during today's encounter. They screened negative with a PHQ-2 score of 0.          History of Present Illness     Adult Annual Physical  Review of Systems   Constitutional:  Negative for activity change, fatigue and fever.   Eyes:  Negative for visual disturbance.   Respiratory:  Negative for shortness of breath.    Cardiovascular:  Negative for chest pain.   Gastrointestinal:  Negative for abdominal pain, constipation, diarrhea and nausea.   Endocrine: Negative for cold intolerance, heat intolerance and polyuria.   Musculoskeletal:  Positive for arthralgias (hands). Negative for back pain.   Skin:  Negative for rash.   Neurological:  Negative for headaches.   Psychiatric/Behavioral:  Negative for confusion.      Medical History Reviewed by provider this encounter:  Tobacco  Allergies  Meds  Problems  Med Hx  Surg Hx  Fam Hx     .  Medications Ordered Prior to Encounter[1]  "  Social History     Tobacco Use    Smoking status: Never    Smokeless tobacco: Never   Vaping Use    Vaping status: Never Used   Substance and Sexual Activity    Alcohol use: Yes     Alcohol/week: 14.0 standard drinks of alcohol     Types: 14 Glasses of wine per week     Comment: 'couple glasses' wine daily     Drug use: No    Sexual activity: Yes     Partners: Female     Birth control/protection: Male Sterilization, None       Objective   /82 (BP Location: Left arm, Patient Position: Sitting, Cuff Size: Standard)   Pulse 78   Temp 97.7 °F (36.5 °C) (Temporal)   Resp 18   Ht 5' 7\" (1.702 m)   Wt 103 kg (226 lb)   SpO2 95%   BMI 35.40 kg/m²     Physical Exam  Vitals and nursing note reviewed.   Constitutional:       Appearance: Normal appearance. He is well-developed.   HENT:      Head: Normocephalic and atraumatic.     Cardiovascular:      Rate and Rhythm: Normal rate and regular rhythm.   Pulmonary:      Effort: Pulmonary effort is normal.      Breath sounds: Normal breath sounds.   Abdominal:      General: Bowel sounds are normal.      Palpations: Abdomen is soft.     Musculoskeletal:         General: Swelling, tenderness and deformity (left hand) present.      Cervical back: Normal range of motion.     Skin:     General: Skin is warm.     Neurological:      General: No focal deficit present.      Mental Status: He is alert.     Psychiatric:         Mood and Affect: Mood normal.         Speech: Speech normal.              [1]   Current Outpatient Medications on File Prior to Visit   Medication Sig Dispense Refill    albuterol (PROVENTIL HFA,VENTOLIN HFA) 90 mcg/act inhaler Inhale 6.7 g if needed      atorvastatin (LIPITOR) 10 mg tablet TAKE 1 TABLET BY MOUTH EVERY DAY 90 tablet 1    betamethasone, augmented, (DIPROLENE-AF) 0.05 % cream Apply 1 application. topically if needed      folic acid (FOLVITE) 400 mcg tablet Take 400 mcg by mouth in the morning.      halobetasol (ULTRAVATE) 0.05 % ointment " Apply topically 2 (two) times a day for 28 days 100 g 0    ketoconazole (NIZORAL) 2 % cream Apply topically daily 30 g 0    mometasone (ASMANEX TWISTHALER) 220 MCG/INH inhaler Inhale in the morning.      Multiple Vitamin (MULTIVITAMIN) tablet Take 1 tablet by mouth in the morning.      sildenafil (REVATIO) 20 mg tablet take 1 tablet by mouth daily if needed for ERECTILE DYSFUNCTION 10 tablet 5    Turmeric 500 MG CAPS Take 1,000 mg by mouth in the morning.      VITAMIN D, CHOLECALCIFEROL, PO Take 3,000 Units by mouth in the morning.       No current facility-administered medications on file prior to visit.

## 2025-05-15 ENCOUNTER — APPOINTMENT (OUTPATIENT)
Dept: LAB | Facility: CLINIC | Age: 70
End: 2025-05-15
Payer: COMMERCIAL

## 2025-05-15 DIAGNOSIS — E78.2 MIXED HYPERLIPIDEMIA: ICD-10-CM

## 2025-05-15 DIAGNOSIS — M19.041 ARTHRITIS OF BOTH HANDS: ICD-10-CM

## 2025-05-15 DIAGNOSIS — E66.811 OBESITY (BMI 30.0-34.9): ICD-10-CM

## 2025-05-15 DIAGNOSIS — Z12.5 SCREENING FOR PROSTATE CANCER: ICD-10-CM

## 2025-05-15 DIAGNOSIS — M19.042 ARTHRITIS OF BOTH HANDS: ICD-10-CM

## 2025-05-15 LAB
ALBUMIN SERPL BCG-MCNC: 4.4 G/DL (ref 3.5–5)
ALP SERPL-CCNC: 50 U/L (ref 34–104)
ALT SERPL W P-5'-P-CCNC: 31 U/L (ref 7–52)
ANION GAP SERPL CALCULATED.3IONS-SCNC: 9 MMOL/L (ref 4–13)
AST SERPL W P-5'-P-CCNC: 25 U/L (ref 13–39)
BILIRUB SERPL-MCNC: 1.24 MG/DL (ref 0.2–1)
BUN SERPL-MCNC: 9 MG/DL (ref 5–25)
CALCIUM SERPL-MCNC: 9 MG/DL (ref 8.4–10.2)
CHLORIDE SERPL-SCNC: 105 MMOL/L (ref 96–108)
CHOLEST SERPL-MCNC: 141 MG/DL (ref ?–200)
CO2 SERPL-SCNC: 25 MMOL/L (ref 21–32)
CREAT SERPL-MCNC: 0.9 MG/DL (ref 0.6–1.3)
ERYTHROCYTE [DISTWIDTH] IN BLOOD BY AUTOMATED COUNT: 13.2 % (ref 11.6–15.1)
GFR SERPL CREATININE-BSD FRML MDRD: 87 ML/MIN/1.73SQ M
GLUCOSE P FAST SERPL-MCNC: 101 MG/DL (ref 65–99)
HCT VFR BLD AUTO: 44 % (ref 36.5–49.3)
HDLC SERPL-MCNC: 76 MG/DL
HGB BLD-MCNC: 15.2 G/DL (ref 12–17)
LDLC SERPL CALC-MCNC: 54 MG/DL (ref 0–100)
MCH RBC QN AUTO: 32.3 PG (ref 26.8–34.3)
MCHC RBC AUTO-ENTMCNC: 34.5 G/DL (ref 31.4–37.4)
MCV RBC AUTO: 93 FL (ref 82–98)
NONHDLC SERPL-MCNC: 65 MG/DL
PLATELET # BLD AUTO: 275 THOUSANDS/UL (ref 149–390)
PMV BLD AUTO: 9 FL (ref 8.9–12.7)
POTASSIUM SERPL-SCNC: 4.3 MMOL/L (ref 3.5–5.3)
PROT SERPL-MCNC: 6.9 G/DL (ref 6.4–8.4)
PSA SERPL-MCNC: 1.88 NG/ML (ref 0–4)
RBC # BLD AUTO: 4.71 MILLION/UL (ref 3.88–5.62)
RHEUMATOID FACT SERPL-ACNC: 10 IU/ML
SODIUM SERPL-SCNC: 139 MMOL/L (ref 135–147)
TRIGL SERPL-MCNC: 55 MG/DL (ref ?–150)
WBC # BLD AUTO: 6.42 THOUSAND/UL (ref 4.31–10.16)

## 2025-05-15 PROCEDURE — G0103 PSA SCREENING: HCPCS

## 2025-05-15 PROCEDURE — 86200 CCP ANTIBODY: CPT

## 2025-05-15 PROCEDURE — 86431 RHEUMATOID FACTOR QUANT: CPT

## 2025-05-15 PROCEDURE — 80061 LIPID PANEL: CPT

## 2025-05-15 PROCEDURE — 36415 COLL VENOUS BLD VENIPUNCTURE: CPT

## 2025-05-15 PROCEDURE — 85027 COMPLETE CBC AUTOMATED: CPT

## 2025-05-15 PROCEDURE — 80053 COMPREHEN METABOLIC PANEL: CPT

## 2025-05-16 ENCOUNTER — RESULTS FOLLOW-UP (OUTPATIENT)
Dept: FAMILY MEDICINE CLINIC | Facility: CLINIC | Age: 70
End: 2025-05-16

## 2025-05-16 LAB — CCP AB SER IA-ACNC: 0.7 (ref ?–10)

## 2025-05-24 ENCOUNTER — OFFICE VISIT (OUTPATIENT)
Dept: URGENT CARE | Facility: MEDICAL CENTER | Age: 70
End: 2025-05-24
Payer: COMMERCIAL

## 2025-05-24 ENCOUNTER — RESULTS FOLLOW-UP (OUTPATIENT)
Dept: URGENT CARE | Facility: MEDICAL CENTER | Age: 70
End: 2025-05-24

## 2025-05-24 ENCOUNTER — APPOINTMENT (OUTPATIENT)
Dept: RADIOLOGY | Facility: MEDICAL CENTER | Age: 70
End: 2025-05-24
Payer: COMMERCIAL

## 2025-05-24 VITALS
TEMPERATURE: 98 F | WEIGHT: 226 LBS | OXYGEN SATURATION: 98 % | RESPIRATION RATE: 18 BRPM | BODY MASS INDEX: 35.47 KG/M2 | HEART RATE: 80 BPM | DIASTOLIC BLOOD PRESSURE: 82 MMHG | SYSTOLIC BLOOD PRESSURE: 138 MMHG | HEIGHT: 67 IN

## 2025-05-24 DIAGNOSIS — M25.542 PAIN INVOLVING JOINT OF FINGER OF LEFT HAND: ICD-10-CM

## 2025-05-24 DIAGNOSIS — M25.542 PAIN INVOLVING JOINT OF FINGER OF LEFT HAND: Primary | ICD-10-CM

## 2025-05-24 DIAGNOSIS — M79.89 SWELLING OF LEFT RING FINGER: ICD-10-CM

## 2025-05-24 PROBLEM — Z12.5 ENCOUNTER FOR PROSTATE CANCER SCREENING: Status: ACTIVE | Noted: 2025-05-24

## 2025-05-24 PROBLEM — M25.559 ARTHRALGIA OF HIP: Status: ACTIVE | Noted: 2025-05-24

## 2025-05-24 PROBLEM — M75.100 NONTRAUMATIC TEAR OF ROTATOR CUFF: Status: ACTIVE | Noted: 2025-05-24

## 2025-05-24 PROCEDURE — G0383 LEV 4 HOSP TYPE B ED VISIT: HCPCS

## 2025-05-24 PROCEDURE — 73140 X-RAY EXAM OF FINGER(S): CPT

## 2025-05-24 PROCEDURE — S9083 URGENT CARE CENTER GLOBAL: HCPCS

## 2025-05-24 RX ORDER — METHYLPREDNISOLONE 4 MG/1
TABLET ORAL
Qty: 1 EACH | Refills: 0 | Status: SHIPPED | OUTPATIENT
Start: 2025-05-24

## 2025-05-24 RX ORDER — FLUTICASONE PROPIONATE AND SALMETEROL 250; 50 UG/1; UG/1
POWDER RESPIRATORY (INHALATION)
COMMUNITY
Start: 2025-05-22

## 2025-05-24 NOTE — PROGRESS NOTES
[facility]        NAME: Gonzalo Montana is a 69 y.o. male  : 1955    MRN: [id number]  DATE: 2025  TIME: 10:51 PM    Assessment and Plan   Pain involving joint of finger of left hand [M25.542]  1. Pain involving joint of finger of left hand  methylPREDNISolone 4 MG tablet therapy pack    CANCELED: XR finger right fourth digit-ring    4th digit      2. Swelling of left ring finger  methylPREDNISolone 4 MG tablet therapy pack        XR finger L fourth digit-ring: No acute fracture per my read, pending radiology final read. If read is any different from my own read, will contact patient with the results.     Patient Instructions   Advised to follow up with PCP about expressed concerns for elevated lab work ordered by his PCP.     Finger pain and swelling:  Differential diagnoses discussed with patient.      Take steroids as prescribed.   Recommend to take them with food    Do not take Ibuprofen while on steroids.   May take Acetaminophen for pain.  Discussed that steroids may elevated blood glucose levels and that pt should monitor blood sugars closely.     May continue previously prescribed Voltaren gel from your PCP for topical relief  Ice 20 minutes 3-4 times per day for 3 days  Insulate the skin from the ice to prevent frostbite  Rest  Elevate    Follow up with orthopedic if symptoms do not improve    If symptoms worsen and you are unable to bend your finger or bend the joints of your finger, you develop any numbness, tingling, discoloration to the finger, or decreased sensation, please proceed to the ER.     Follow up with PCP in 3-5 days.  Proceed to ER if symptoms worsen as previously directed.     If tests are performed, our office will contact you with results only if changes need to made to the care plan discussed with you at the visit. [facility]    Chief Complaint     Chief Complaint   Patient presents with   • Finger Pain     Left fourth  "digit pain/swelling; denies trauma or known injury; \"states a feather can touch it and it is extremely painful\"; worse at night    • Abnormal Lab     States he is concerned due to recent lab work from pcp; slightly elevated bilirubin- states he does drink homemade wine          History of Present Illness       Hand Pain   The incident occurred more than 1 week ago. There was no injury mechanism. The pain is present in the left fingers. The pain has been Constant (Worse at night) since the incident. Pertinent negatives include no chest pain. The symptoms are aggravated by palpation.       Review of Systems   Review of Systems   Constitutional:  Negative for chills, diaphoresis, fatigue and fever.   Respiratory: Negative.  Negative for cough, shortness of breath and wheezing.    Cardiovascular: Negative.  Negative for chest pain and palpitations.   Musculoskeletal:  Negative for arthralgias, joint swelling and myalgias.        L ring finger swelling   Skin:  Negative for color change, rash and wound.       Current Medications     Current Medications[1]    Current Allergies     Allergies as of 05/24/2025 - Reviewed 05/24/2025   Allergen Reaction Noted   • Other Allergic Rhinitis 04/25/2017            The following portions of the patient's history were reviewed and updated as appropriate: allergies, current medications, past family history, past medical history, past social history, past surgical history and problem list.     Past Medical History[2]    Past Surgical History[3]    Family History[4]      Medications have been verified.        Objective   /82   Pulse 80   Temp 98 °F (36.7 °C) (Temporal)   Resp 18   Ht 5' 7\" (1.702 m)   Wt 103 kg (226 lb)   SpO2 98%   BMI 35.40 kg/m²        Physical Exam     Physical Exam  Vitals and nursing note reviewed.   Constitutional:       General: He is not in acute distress.     Appearance: Normal appearance. He is not ill-appearing, toxic-appearing or diaphoretic. "   HENT:      Head: Normocephalic.     Cardiovascular:      Rate and Rhythm: Normal rate and regular rhythm.      Pulses: Normal pulses.      Heart sounds: Normal heart sounds. No murmur heard.  Pulmonary:      Effort: Pulmonary effort is normal. No respiratory distress.      Breath sounds: Normal breath sounds. No stridor. No wheezing, rhonchi or rales.   Chest:      Chest wall: No tenderness.     Musculoskeletal:         General: Swelling present. No tenderness, deformity or signs of injury.      Left hand: Swelling present. No deformity, tenderness or bony tenderness. Normal range of motion. Normal strength. Normal sensation. There is no disruption of two-point discrimination. Normal capillary refill. Normal pulse.        Arms:       Comments: L 4th digit swelling, no break or disruption in skin's integrity, no erythema, no change in skin's temperature. DIP, PIP and MCP joints all intact.      Skin:     General: Skin is warm and dry.      Coloration: Skin is not pale.      Findings: No bruising or erythema.     Neurological:      Mental Status: He is alert.                          [1]    Current Outpatient Medications:   •  Fluticasone-Salmeterol (Advair) 250-50 mcg/dose inhaler, , Disp: , Rfl:   •  methylPREDNISolone 4 MG tablet therapy pack, Use as directed on package, Disp: 1 each, Rfl: 0  •  albuterol (PROVENTIL HFA,VENTOLIN HFA) 90 mcg/act inhaler, Inhale 6.7 g if needed, Disp: , Rfl:   •  atorvastatin (LIPITOR) 10 mg tablet, TAKE 1 TABLET BY MOUTH EVERY DAY, Disp: 90 tablet, Rfl: 1  •  betamethasone, augmented, (DIPROLENE-AF) 0.05 % cream, Apply 1 application. topically if needed, Disp: , Rfl:   •  Diclofenac Sodium (VOLTAREN) 1 %, Apply 2 g topically 4 (four) times a day, Disp: 100 g, Rfl: 2  •  folic acid (FOLVITE) 400 mcg tablet, Take 400 mcg by mouth in the morning., Disp: , Rfl:   •  halobetasol (ULTRAVATE) 0.05 % ointment, Apply topically 2 (two) times a day for 28 days, Disp: 100 g, Rfl: 0  •   ketoconazole (NIZORAL) 2 % cream, Apply topically daily, Disp: 30 g, Rfl: 0  •  mometasone (ASMANEX TWISTHALER) 220 MCG/INH inhaler, Inhale in the morning., Disp: , Rfl:   •  Multiple Vitamin (MULTIVITAMIN) tablet, Take 1 tablet by mouth in the morning., Disp: , Rfl:   •  sildenafil (REVATIO) 20 mg tablet, take 1 tablet by mouth daily if needed for ERECTILE DYSFUNCTION, Disp: 10 tablet, Rfl: 5  •  Turmeric 500 MG CAPS, Take 1,000 mg by mouth in the morning., Disp: , Rfl:   •  VITAMIN D, CHOLECALCIFEROL, PO, Take 3,000 Units by mouth in the morning., Disp: , Rfl: [2]  Past Medical History:  Diagnosis Date   • Asthma    • Crushing injury of hand and fingers 10/22/2012    Crush injury of fingers of right hand   • Erectile dysfunction of non-organic origin    • GERD (gastroesophageal reflux disease)    • Shoulder joint pain 01/30/2013    Unspecified laterality   • Umbilical hernia    [3]  Past Surgical History:  Procedure Laterality Date   • CATARACT EXTRACTION, BILATERAL     • FOOT SURGERY     • HERNIA REPAIR     • KNEE SURGERY     • AL SURGICAL ARTHROSCOPY SHOULDER REPAIR SLAP LESION Left 5/2/2017    Procedure: ARTHROSCOPY SHOULDER W/ bicep tendodesis, SAD ;  Surgeon: Vinh Blair MD;  Location: [facility];  Service: Orthopedics   [4]  Family History  Problem Relation Name Age of Onset   • No Known Problems Mother     • No Known Problems Father     • No Known Problems Family

## 2025-05-24 NOTE — PATIENT INSTRUCTIONS
Advised to follow up with PCP about expressed concerns for elevated lab work ordered by his PCP.     Finger pain and swelling:  Differential diagnoses discussed with patient.      Take steroids as prescribed.   Recommend to take them with food    Do not take Ibuprofen while on steroids.   May take Acetaminophen for pain.  Discussed that steroids may elevated blood glucose levels and that pt should monitor blood sugars closely.     May continue previously prescribed Voltaren gel from your PCP for topical relief  Ice 20 minutes 3-4 times per day for 3 days  Insulate the skin from the ice to prevent frostbite  Rest  Elevate    Follow up with orthopedic if symptoms do not improve    If symptoms worsen and you are unable to bend your finger or bend the joints of your finger, you develop any numbness, tingling, discoloration to the finger, or decreased sensation, please proceed to the ER.     Follow up with PCP in 3-5 days.  Proceed to ER if symptoms worsen as previously directed.     If tests are performed, our office will contact you with results only if changes need to made to the care plan discussed with you at the visit. You can review your full results on [facility].

## 2025-06-23 PROBLEM — Z12.5 ENCOUNTER FOR PROSTATE CANCER SCREENING: Status: RESOLVED | Noted: 2025-05-24 | Resolved: 2025-06-23

## 2025-07-03 ENCOUNTER — VBI (OUTPATIENT)
Dept: ADMINISTRATIVE | Facility: OTHER | Age: 70
End: 2025-07-03

## 2025-07-03 NOTE — TELEPHONE ENCOUNTER
07/03/25 1:00 PM     Chart reviewed for CRC: Colonoscopy ; nothing is submitted to the patient's insurance at this time.     Jaimee Luevano MA   [facility]

## (undated) DEVICE — GLOVE INDICATOR PI UNDERGLOVE SZ 7.5 BLUE

## (undated) DEVICE — PACK SHOULDER ARTHROSCOPY PBDS

## (undated) DEVICE — THREADED CLEAR CANNULA WITH OBTURATOR 5.5MM X 75MM

## (undated) DEVICE — VAPR COOLPULSE 90 ELECTRODE 90 DEGREES SUCTION WITH INTEGRATED HANDPIECE: Brand: VAPR COOLPULSE

## (undated) DEVICE — TUBING ARTHROSCOPIC WAVE  MAIN PUMP

## (undated) DEVICE — THREADED CLEAR CANNULA WITH OBTURATOR 7MM X 75MM

## (undated) DEVICE — SUT MONOCRYL 4-0 PS-2 18 IN Y496G

## (undated) DEVICE — THREADED CLEAR CANNULA WITH OBTURATOR 8.5MM X 75MM

## (undated) DEVICE — SOFT SILICONE HYDROCELLULAR FOAM DRESSING WITH LOCK AWAY LAYER: Brand: ALLEVYN LIFE M 12.9X12.9 CTN10

## (undated) DEVICE — SPONGE PVP SCRUB WING STERILE

## (undated) DEVICE — 3M™ IOBAN™ 2 ANTIMICROBIAL INCISE DRAPE 6650EZ: Brand: IOBAN™ 2

## (undated) DEVICE — DRESSING MEPILEX AG BORDER 3 X 3 IN

## (undated) DEVICE — DISPOSABLE EQUIPMENT COVER: Brand: SMALL TOWEL DRAPE

## (undated) DEVICE — CHLORAPREP HI-LITE 26ML ORANGE

## (undated) DEVICE — GLOVE SRG BIOGEL 7.5

## (undated) DEVICE — TUBING SUCTION 5MM X 12 FT

## (undated) DEVICE — ADHESIVE SKN CLSR HISTOACRYL FLEX 0.5ML LF

## (undated) DEVICE — INTENDED FOR TISSUE SEPARATION, AND OTHER PROCEDURES THAT REQUIRE A SHARP SURGICAL BLADE TO PUNCTURE OR CUT.: Brand: BARD-PARKER SAFETY BLADES SIZE 11, STERILE

## (undated) DEVICE — SHOULDER SUSPENSION KIT 6 PER BOX